# Patient Record
Sex: MALE | Race: WHITE | NOT HISPANIC OR LATINO | Employment: OTHER | URBAN - METROPOLITAN AREA
[De-identification: names, ages, dates, MRNs, and addresses within clinical notes are randomized per-mention and may not be internally consistent; named-entity substitution may affect disease eponyms.]

---

## 2020-05-07 ENCOUNTER — HOSPITAL ENCOUNTER (INPATIENT)
Facility: HOSPITAL | Age: 70
LOS: 8 days | Discharge: PSYCHIATRIC OR SUBSTANCE USE FACILITY | DRG: 871 | End: 2020-05-15
Attending: EMERGENCY MEDICINE | Admitting: INTERNAL MEDICINE
Payer: MEDICARE

## 2020-05-07 DIAGNOSIS — R53.1 GENERALIZED WEAKNESS: ICD-10-CM

## 2020-05-07 DIAGNOSIS — R65.20 SEPSIS WITH METABOLIC ENCEPHALOPATHY: Primary | ICD-10-CM

## 2020-05-07 DIAGNOSIS — N39.0 URINARY TRACT INFECTION WITHOUT HEMATURIA, SITE UNSPECIFIED: ICD-10-CM

## 2020-05-07 DIAGNOSIS — N30.00 ACUTE CYSTITIS: ICD-10-CM

## 2020-05-07 DIAGNOSIS — G93.41 SEPSIS WITH METABOLIC ENCEPHALOPATHY: Primary | ICD-10-CM

## 2020-05-07 DIAGNOSIS — I10 EPISODE OF HYPERTENSION: ICD-10-CM

## 2020-05-07 DIAGNOSIS — A41.9 SEPSIS WITH METABOLIC ENCEPHALOPATHY: Primary | ICD-10-CM
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
## 2020-05-07 PROBLEM — F10.10 ALCOHOL ABUSE: Status: ACTIVE | Noted: 2020-05-07

## 2020-05-07 LAB
ALBUMIN SERPL BCP-MCNC: 3.7 G/DL (ref 3.5–5.2)
ALP SERPL-CCNC: 80 U/L (ref 55–135)
ALT SERPL W/O P-5'-P-CCNC: 21 U/L (ref 10–44)
ANION GAP SERPL CALC-SCNC: 11 MMOL/L (ref 8–16)
AST SERPL-CCNC: 17 U/L (ref 10–40)
BACTERIA #/AREA URNS HPF: ABNORMAL /HPF
BASOPHILS # BLD AUTO: 0.05 K/UL (ref 0–0.2)
BASOPHILS NFR BLD: 0.3 % (ref 0–1.9)
BILIRUB SERPL-MCNC: 1.3 MG/DL (ref 0.1–1)
BILIRUB UR QL STRIP: NEGATIVE
BUN SERPL-MCNC: 15 MG/DL (ref 8–23)
CALCIUM SERPL-MCNC: 8.4 MG/DL (ref 8.7–10.5)
CHLORIDE SERPL-SCNC: 100 MMOL/L (ref 95–110)
CK SERPL-CCNC: 52 U/L (ref 20–200)
CLARITY UR: ABNORMAL
CO2 SERPL-SCNC: 24 MMOL/L (ref 23–29)
COLOR UR: YELLOW
CREAT SERPL-MCNC: 1 MG/DL (ref 0.5–1.4)
CRP SERPL-MCNC: 14.69 MG/DL (ref 0–0.75)
DIFFERENTIAL METHOD: ABNORMAL
EOSINOPHIL # BLD AUTO: 0.1 K/UL (ref 0–0.5)
EOSINOPHIL NFR BLD: 0.5 % (ref 0–8)
ERYTHROCYTE [DISTWIDTH] IN BLOOD BY AUTOMATED COUNT: 12.7 % (ref 11.5–14.5)
ERYTHROCYTE [SEDIMENTATION RATE] IN BLOOD BY WESTERGREN METHOD: >90 MM/HR (ref 0–10)
EST. GFR  (AFRICAN AMERICAN): >60 ML/MIN/1.73 M^2
EST. GFR  (NON AFRICAN AMERICAN): >60 ML/MIN/1.73 M^2
GLUCOSE SERPL-MCNC: 101 MG/DL (ref 70–110)
GLUCOSE UR QL STRIP: NEGATIVE
HCT VFR BLD AUTO: 41.9 % (ref 40–54)
HGB BLD-MCNC: 14.4 G/DL (ref 14–18)
HGB UR QL STRIP: ABNORMAL
HYALINE CASTS #/AREA URNS LPF: 0 /LPF
IMM GRANULOCYTES # BLD AUTO: 0.07 K/UL (ref 0–0.04)
IMM GRANULOCYTES NFR BLD AUTO: 0.5 % (ref 0–0.5)
KETONES UR QL STRIP: NEGATIVE
LACTATE SERPL-SCNC: 1 MMOL/L (ref 0.5–2.2)
LEUKOCYTE ESTERASE UR QL STRIP: ABNORMAL
LYMPHOCYTES # BLD AUTO: 1.4 K/UL (ref 1–4.8)
LYMPHOCYTES NFR BLD: 9.3 % (ref 18–48)
MCH RBC QN AUTO: 30.3 PG (ref 27–31)
MCHC RBC AUTO-ENTMCNC: 34.4 G/DL (ref 32–36)
MCV RBC AUTO: 88 FL (ref 82–98)
MICROSCOPIC COMMENT: ABNORMAL
MONOCYTES # BLD AUTO: 1.7 K/UL (ref 0.3–1)
MONOCYTES NFR BLD: 11.3 % (ref 4–15)
NEUTROPHILS # BLD AUTO: 11.8 K/UL (ref 1.8–7.7)
NEUTROPHILS NFR BLD: 78.1 % (ref 38–73)
NITRITE UR QL STRIP: POSITIVE
NRBC BLD-RTO: 0 /100 WBC
PH UR STRIP: 5 [PH] (ref 5–8)
PLATELET # BLD AUTO: 271 K/UL (ref 150–350)
PMV BLD AUTO: 10 FL (ref 9.2–12.9)
POTASSIUM SERPL-SCNC: 3.4 MMOL/L (ref 3.5–5.1)
PROT SERPL-MCNC: 8.4 G/DL (ref 6–8.4)
PROT UR QL STRIP: ABNORMAL
RBC # BLD AUTO: 4.75 M/UL (ref 4.6–6.2)
RBC #/AREA URNS HPF: 15 /HPF (ref 0–4)
SARS-COV-2 RDRP RESP QL NAA+PROBE: NEGATIVE
SODIUM SERPL-SCNC: 135 MMOL/L (ref 136–145)
SP GR UR STRIP: 1.01 (ref 1–1.03)
URN SPEC COLLECT METH UR: ABNORMAL
UROBILINOGEN UR STRIP-ACNC: NEGATIVE EU/DL
WBC # BLD AUTO: 15.09 K/UL (ref 3.9–12.7)
WBC #/AREA URNS HPF: >100 /HPF (ref 0–5)

## 2020-05-07 PROCEDURE — 11000001 HC ACUTE MED/SURG PRIVATE ROOM

## 2020-05-07 PROCEDURE — 71045 XR CHEST 1 VIEW: ICD-10-PCS | Mod: 26,,, | Performed by: RADIOLOGY

## 2020-05-07 PROCEDURE — 83605 ASSAY OF LACTIC ACID: CPT

## 2020-05-07 PROCEDURE — 70450 CT HEAD/BRAIN W/O DYE: CPT | Mod: 26,,, | Performed by: RADIOLOGY

## 2020-05-07 PROCEDURE — 71045 X-RAY EXAM CHEST 1 VIEW: CPT | Mod: TC,FY

## 2020-05-07 PROCEDURE — 51702 INSERT TEMP BLADDER CATH: CPT

## 2020-05-07 PROCEDURE — 81000 URINALYSIS NONAUTO W/SCOPE: CPT

## 2020-05-07 PROCEDURE — 87077 CULTURE AEROBIC IDENTIFY: CPT

## 2020-05-07 PROCEDURE — 85025 COMPLETE CBC W/AUTO DIFF WBC: CPT

## 2020-05-07 PROCEDURE — 80053 COMPREHEN METABOLIC PANEL: CPT

## 2020-05-07 PROCEDURE — 63600175 PHARM REV CODE 636 W HCPCS: Performed by: EMERGENCY MEDICINE

## 2020-05-07 PROCEDURE — 85651 RBC SED RATE NONAUTOMATED: CPT

## 2020-05-07 PROCEDURE — 96365 THER/PROPH/DIAG IV INF INIT: CPT

## 2020-05-07 PROCEDURE — 82550 ASSAY OF CK (CPK): CPT

## 2020-05-07 PROCEDURE — 70450 CT HEAD/BRAIN W/O DYE: CPT | Mod: TC

## 2020-05-07 PROCEDURE — 87088 URINE BACTERIA CULTURE: CPT

## 2020-05-07 PROCEDURE — 86140 C-REACTIVE PROTEIN: CPT

## 2020-05-07 PROCEDURE — 63600175 PHARM REV CODE 636 W HCPCS

## 2020-05-07 PROCEDURE — 87186 SC STD MICRODIL/AGAR DIL: CPT

## 2020-05-07 PROCEDURE — 87040 BLOOD CULTURE FOR BACTERIA: CPT

## 2020-05-07 PROCEDURE — 12000002 HC ACUTE/MED SURGE SEMI-PRIVATE ROOM

## 2020-05-07 PROCEDURE — U0002 COVID-19 LAB TEST NON-CDC: HCPCS

## 2020-05-07 PROCEDURE — 70450 CT HEAD WITHOUT CONTRAST: ICD-10-PCS | Mod: 26,,, | Performed by: RADIOLOGY

## 2020-05-07 PROCEDURE — 87086 URINE CULTURE/COLONY COUNT: CPT

## 2020-05-07 PROCEDURE — 99285 EMERGENCY DEPT VISIT HI MDM: CPT | Mod: 25

## 2020-05-07 PROCEDURE — 71045 X-RAY EXAM CHEST 1 VIEW: CPT | Mod: 26,,, | Performed by: RADIOLOGY

## 2020-05-07 RX ORDER — IPRATROPIUM BROMIDE AND ALBUTEROL SULFATE 2.5; .5 MG/3ML; MG/3ML
3 SOLUTION RESPIRATORY (INHALATION) EVERY 6 HOURS PRN
Status: DISCONTINUED | OUTPATIENT
Start: 2020-05-07 | End: 2020-05-10

## 2020-05-07 RX ORDER — IBUPROFEN 400 MG/1
400 TABLET ORAL EVERY 6 HOURS PRN
Status: DISCONTINUED | OUTPATIENT
Start: 2020-05-07 | End: 2020-05-15 | Stop reason: HOSPADM

## 2020-05-07 RX ORDER — KETOROLAC TROMETHAMINE 30 MG/ML
15 INJECTION, SOLUTION INTRAMUSCULAR; INTRAVENOUS EVERY 6 HOURS PRN
Status: DISPENSED | OUTPATIENT
Start: 2020-05-07 | End: 2020-05-10

## 2020-05-07 RX ORDER — ACETAMINOPHEN 325 MG/1
650 TABLET ORAL EVERY 4 HOURS PRN
Status: DISCONTINUED | OUTPATIENT
Start: 2020-05-07 | End: 2020-05-15 | Stop reason: HOSPADM

## 2020-05-07 RX ORDER — ONDANSETRON 2 MG/ML
4 INJECTION INTRAMUSCULAR; INTRAVENOUS EVERY 8 HOURS PRN
Status: DISCONTINUED | OUTPATIENT
Start: 2020-05-07 | End: 2020-05-15 | Stop reason: HOSPADM

## 2020-05-07 RX ORDER — AMOXICILLIN 250 MG
1 CAPSULE ORAL 2 TIMES DAILY
Status: DISCONTINUED | OUTPATIENT
Start: 2020-05-07 | End: 2020-05-15 | Stop reason: HOSPADM

## 2020-05-07 RX ORDER — DIAZEPAM 5 MG/1
5 TABLET ORAL EVERY 8 HOURS
Status: DISCONTINUED | OUTPATIENT
Start: 2020-05-08 | End: 2020-05-13

## 2020-05-07 RX ORDER — ZIPRASIDONE MESYLATE 20 MG/ML
INJECTION, POWDER, LYOPHILIZED, FOR SOLUTION INTRAMUSCULAR
Status: DISPENSED
Start: 2020-05-07 | End: 2020-05-08

## 2020-05-07 RX ORDER — LORAZEPAM 2 MG/ML
1 INJECTION INTRAMUSCULAR EVERY 4 HOURS PRN
Status: DISCONTINUED | OUTPATIENT
Start: 2020-05-07 | End: 2020-05-15 | Stop reason: HOSPADM

## 2020-05-07 RX ORDER — SODIUM CHLORIDE 0.9 % (FLUSH) 0.9 %
10 SYRINGE (ML) INJECTION
Status: DISCONTINUED | OUTPATIENT
Start: 2020-05-07 | End: 2020-05-15 | Stop reason: HOSPADM

## 2020-05-07 RX ORDER — ENOXAPARIN SODIUM 100 MG/ML
40 INJECTION SUBCUTANEOUS EVERY 24 HOURS
Status: DISCONTINUED | OUTPATIENT
Start: 2020-05-07 | End: 2020-05-15 | Stop reason: HOSPADM

## 2020-05-07 RX ORDER — BISACODYL 10 MG
10 SUPPOSITORY, RECTAL RECTAL DAILY PRN
Status: DISCONTINUED | OUTPATIENT
Start: 2020-05-07 | End: 2020-05-15 | Stop reason: HOSPADM

## 2020-05-07 RX ADMIN — CEFTRIAXONE 1 G: 1 INJECTION, SOLUTION INTRAVENOUS at 08:05

## 2020-05-07 NOTE — ED PROVIDER NOTES
Encounter Date: 5/7/2020       History     Chief Complaint   Patient presents with    Fall    Fatigue     70-year-old male arriving per EMS status post reported fall.  Patient unable to ambulate after incident due to right lower extremity giving out.  Patient denies headache, chest pain or shortness of breath prior to after incident.  Denies striking his head.  Denies anticoagulant use.  Denies neck or back pain.  EMS reports patient was sitting upright with a neighbor upon their arrival.  EMS reports deplorable housing conditions.  Denies numbness, tingling or weakness.  Denies headache or vision changes.  Denies chest pain, shortness of breath or abdominal pain.  Denies dysuria frequency.  Patient febrile all at home and upon arrival.  Denies history of such.  Denies sore throat or cough.  Denies specific exacerbating or alleviating factors.  Denies injury to his back or lower extremity.  Patient reports he feels unstable when standing on the right lower extremity and this has been going on for quite some time.        Review of patient's allergies indicates:  No Known Allergies  History reviewed. No pertinent past medical history.  History reviewed. No pertinent surgical history.  No family history on file.  Social History     Tobacco Use    Smoking status: Never Smoker    Smokeless tobacco: Never Used   Substance Use Topics    Alcohol use: Yes    Drug use: Never     Review of Systems   Constitutional: Negative for appetite change and fever.   HENT: Negative for congestion, facial swelling, sinus pressure and sore throat.    Respiratory: Negative for chest tightness and shortness of breath.    Cardiovascular: Negative for chest pain and palpitations.   Gastrointestinal: Negative for abdominal pain, nausea and vomiting.   Genitourinary: Negative for dysuria, frequency and urgency.   Musculoskeletal: Negative for back pain.   Skin: Negative for rash and wound.   Neurological: Positive for weakness. Negative  for dizziness, seizures, facial asymmetry, speech difficulty, light-headedness and headaches.   Hematological: Does not bruise/bleed easily.       Physical Exam     Initial Vitals [05/07/20 1834]   BP Pulse Resp Temp SpO2   (!) 186/98 88 18 (!) 101.4 °F (38.6 °C) 96 %      MAP       --         Physical Exam    Nursing note and vitals reviewed.  Constitutional: He appears well-developed and well-nourished.   HENT:   Head: Normocephalic and atraumatic.   Mouth/Throat: Uvula is midline, oropharynx is clear and moist and mucous membranes are normal. No trismus in the jaw.   Eyes: Conjunctivae and EOM are normal. Pupils are equal, round, and reactive to light.   Neck: Normal range of motion. Neck supple.   Cardiovascular: Normal rate, regular rhythm, normal heart sounds and intact distal pulses.   Pulmonary/Chest: Breath sounds normal. No respiratory distress. He has no wheezes.   Abdominal: Soft. Bowel sounds are normal. He exhibits no distension. There is no tenderness.   Musculoskeletal: Normal range of motion. He exhibits no edema or tenderness.   No midline cervical, thoracic or lumbosacral spinal tenderness, step-off or crepitus.  No outward signs of trauma.  Pelvis stable to compression and reported nontender.  Full active/passive range of motion intact all 4 extremities without reported tenderness.   Neurological: He is alert and oriented to person, place, and time. GCS eye subscore is 4. GCS verbal subscore is 4. GCS motor subscore is 6.   No focal/lateralizing neuro deficit   Skin: Skin is warm. Capillary refill takes less than 2 seconds.         ED Course   Procedures  Labs Reviewed   CBC W/ AUTO DIFFERENTIAL - Abnormal; Notable for the following components:       Result Value    WBC 15.09 (*)     Gran # (ANC) 11.8 (*)     Immature Grans (Abs) 0.07 (*)     Mono # 1.7 (*)     Gran% 78.1 (*)     Lymph% 9.3 (*)     All other components within normal limits   COMPREHENSIVE METABOLIC PANEL - Abnormal; Notable for  the following components:    Sodium 135 (*)     Potassium 3.4 (*)     Calcium 8.4 (*)     Total Bilirubin 1.3 (*)     All other components within normal limits   C-REACTIVE PROTEIN - Abnormal; Notable for the following components:    CRP 14.69 (*)     All other components within normal limits   SEDIMENTATION RATE - Abnormal; Notable for the following components:    Sed Rate >90 (*)     All other components within normal limits   URINALYSIS, REFLEX TO URINE CULTURE - Abnormal; Notable for the following components:    Appearance, UA Cloudy (*)     Protein, UA 2+ (*)     Occult Blood UA 2+ (*)     Nitrite, UA Positive (*)     Leukocytes, UA 2+ (*)     All other components within normal limits    Narrative:     Preferred Collection Type->Urine, Catheterized   URINALYSIS MICROSCOPIC - Abnormal; Notable for the following components:    RBC, UA 15 (*)     WBC, UA >100 (*)     Bacteria Moderate (*)     All other components within normal limits    Narrative:     Preferred Collection Type->Urine, Catheterized   CULTURE, URINE   CULTURE, BLOOD   CULTURE, BLOOD   CK   LACTIC ACID, PLASMA   SARS-COV-2 RNA AMPLIFICATION, QUAL          Imaging Results          X-Ray Chest 1 View (In process)                CT Head Without Contrast (In process)               X-Rays:   Independently Interpreted Readings:   Other Readings:  Preliminary chest x-ray without acute cardiopulmonary processes.  No consolidation, pneumothorax or acute osseous processes.    Medical Decision Making:   Initial Assessment:   70-year-old male nontoxic-appearing, afebrile, hypertensive with history of fall at home and inability to ambulate.  Patient denies pain or direct trauma.  Denies loss of consciousness.  Denies numbness or tingling.  Patient does report weakness to the right knee with attempts at ambulation.  Denies rash or lesions.  Patient febrile with unknown source.  Will send screening labs.  X-ray imaging rule out occult osseous process;  reassessment  Clinical Tests:   Lab Tests: Ordered and Reviewed  Radiological Study: Ordered and Reviewed  ED Management:  Preliminary laboratory data with leukocytosis, no anemia, metabolic panel without acidemia.  BUN/creatinine within normal limits.  Blood cultures x2 pending.  Urinalysis with nitrite positive urinary tract infection.    Patient GCS 14 with intermittent outbursts of anger.  Patient for feels criteria for sepsis with likely source of urinary tract infection.    COVID-19 negative.    Based on HPI, physical exam with metabolic encephalopathy secondary to sepsis likely source is UTI patient be admitted for IV antibiotics with possible discharge to skilled nursing facility under hospitalist Medicine Dr. hill                                 Clinical Impression:       ICD-10-CM ICD-9-CM   1. Sepsis with metabolic encephalopathy A41.9 038.9    R65.20 995.92    G93.41 348.31   2. Fall W19.XXXA E888.9   3. Urinary tract infection without hematuria, site unspecified N39.0 599.0   4. Generalized weakness R53.1 780.79   5. Episode of hypertension I10 401.9             ED Disposition Condition    Admit                           John Schreiber,   05/07/20 9885

## 2020-05-08 LAB
ALBUMIN SERPL BCP-MCNC: 3.7 G/DL (ref 3.5–5.2)
ALP SERPL-CCNC: 77 U/L (ref 55–135)
ALT SERPL W/O P-5'-P-CCNC: 21 U/L (ref 10–44)
ANION GAP SERPL CALC-SCNC: 10 MMOL/L (ref 8–16)
AST SERPL-CCNC: 17 U/L (ref 10–40)
BASOPHILS # BLD AUTO: 0.04 K/UL (ref 0–0.2)
BASOPHILS NFR BLD: 0.3 % (ref 0–1.9)
BILIRUB SERPL-MCNC: 1.4 MG/DL (ref 0.1–1)
BUN SERPL-MCNC: 16 MG/DL (ref 8–23)
CALCIUM SERPL-MCNC: 8.3 MG/DL (ref 8.7–10.5)
CHLORIDE SERPL-SCNC: 101 MMOL/L (ref 95–110)
CO2 SERPL-SCNC: 24 MMOL/L (ref 23–29)
CREAT SERPL-MCNC: 1 MG/DL (ref 0.5–1.4)
DIFFERENTIAL METHOD: ABNORMAL
EOSINOPHIL # BLD AUTO: 0 K/UL (ref 0–0.5)
EOSINOPHIL NFR BLD: 0.2 % (ref 0–8)
ERYTHROCYTE [DISTWIDTH] IN BLOOD BY AUTOMATED COUNT: 13.1 % (ref 11.5–14.5)
EST. GFR  (AFRICAN AMERICAN): >60 ML/MIN/1.73 M^2
EST. GFR  (NON AFRICAN AMERICAN): >60 ML/MIN/1.73 M^2
GLUCOSE SERPL-MCNC: 114 MG/DL (ref 70–110)
HCT VFR BLD AUTO: 40.2 % (ref 40–54)
HGB BLD-MCNC: 13.6 G/DL (ref 14–18)
IMM GRANULOCYTES # BLD AUTO: 0.08 K/UL (ref 0–0.04)
IMM GRANULOCYTES NFR BLD AUTO: 0.6 % (ref 0–0.5)
LYMPHOCYTES # BLD AUTO: 1.6 K/UL (ref 1–4.8)
LYMPHOCYTES NFR BLD: 11 % (ref 18–48)
MAGNESIUM SERPL-MCNC: 2 MG/DL (ref 1.6–2.6)
MCH RBC QN AUTO: 29.8 PG (ref 27–31)
MCHC RBC AUTO-ENTMCNC: 33.8 G/DL (ref 32–36)
MCV RBC AUTO: 88 FL (ref 82–98)
MONOCYTES # BLD AUTO: 1.5 K/UL (ref 0.3–1)
MONOCYTES NFR BLD: 10.8 % (ref 4–15)
NEUTROPHILS # BLD AUTO: 11.1 K/UL (ref 1.8–7.7)
NEUTROPHILS NFR BLD: 77.1 % (ref 38–73)
NRBC BLD-RTO: 0 /100 WBC
PHOSPHATE SERPL-MCNC: 2.7 MG/DL (ref 2.7–4.5)
PLATELET # BLD AUTO: 283 K/UL (ref 150–350)
PMV BLD AUTO: 10.8 FL (ref 9.2–12.9)
POTASSIUM SERPL-SCNC: 3.2 MMOL/L (ref 3.5–5.1)
PROT SERPL-MCNC: 7.8 G/DL (ref 6–8.4)
RBC # BLD AUTO: 4.57 M/UL (ref 4.6–6.2)
SODIUM SERPL-SCNC: 135 MMOL/L (ref 136–145)
WBC # BLD AUTO: 14.32 K/UL (ref 3.9–12.7)

## 2020-05-08 PROCEDURE — 94761 N-INVAS EAR/PLS OXIMETRY MLT: CPT

## 2020-05-08 PROCEDURE — 86580 TB INTRADERMAL TEST: CPT | Performed by: INTERNAL MEDICINE

## 2020-05-08 PROCEDURE — 63600175 PHARM REV CODE 636 W HCPCS: Performed by: HOSPITALIST

## 2020-05-08 PROCEDURE — 97161 PT EVAL LOW COMPLEX 20 MIN: CPT

## 2020-05-08 PROCEDURE — 99233 PR SUBSEQUENT HOSPITAL CARE,LEVL III: ICD-10-PCS | Mod: ,,, | Performed by: INTERNAL MEDICINE

## 2020-05-08 PROCEDURE — 25000003 PHARM REV CODE 250: Performed by: INTERNAL MEDICINE

## 2020-05-08 PROCEDURE — 84100 ASSAY OF PHOSPHORUS: CPT

## 2020-05-08 PROCEDURE — 97530 THERAPEUTIC ACTIVITIES: CPT

## 2020-05-08 PROCEDURE — 11000001 HC ACUTE MED/SURG PRIVATE ROOM

## 2020-05-08 PROCEDURE — 80053 COMPREHEN METABOLIC PANEL: CPT

## 2020-05-08 PROCEDURE — 99233 SBSQ HOSP IP/OBS HIGH 50: CPT | Mod: ,,, | Performed by: INTERNAL MEDICINE

## 2020-05-08 PROCEDURE — 85025 COMPLETE CBC W/AUTO DIFF WBC: CPT

## 2020-05-08 PROCEDURE — 25000003 PHARM REV CODE 250: Performed by: HOSPITALIST

## 2020-05-08 PROCEDURE — 25000242 PHARM REV CODE 250 ALT 637 W/ HCPCS: Performed by: HOSPITALIST

## 2020-05-08 PROCEDURE — 30200315 PPD INTRADERMAL TEST REV CODE 302: Performed by: INTERNAL MEDICINE

## 2020-05-08 PROCEDURE — 83735 ASSAY OF MAGNESIUM: CPT

## 2020-05-08 PROCEDURE — 63600175 PHARM REV CODE 636 W HCPCS: Performed by: INTERNAL MEDICINE

## 2020-05-08 PROCEDURE — 36415 COLL VENOUS BLD VENIPUNCTURE: CPT

## 2020-05-08 PROCEDURE — 94640 AIRWAY INHALATION TREATMENT: CPT

## 2020-05-08 RX ORDER — POTASSIUM CHLORIDE 20 MEQ/1
20 TABLET, EXTENDED RELEASE ORAL 2 TIMES DAILY
Status: DISCONTINUED | OUTPATIENT
Start: 2020-05-08 | End: 2020-05-15 | Stop reason: HOSPADM

## 2020-05-08 RX ORDER — POTASSIUM CHLORIDE 7.45 MG/ML
10 INJECTION INTRAVENOUS ONCE
Status: COMPLETED | OUTPATIENT
Start: 2020-05-08 | End: 2020-05-08

## 2020-05-08 RX ORDER — CLORAZEPATE DIPOTASSIUM 7.5 MG/1
7.5 TABLET ORAL 3 TIMES DAILY
Status: DISCONTINUED | OUTPATIENT
Start: 2020-05-08 | End: 2020-05-14

## 2020-05-08 RX ORDER — ZIPRASIDONE MESYLATE 20 MG/ML
10 INJECTION, POWDER, LYOPHILIZED, FOR SOLUTION INTRAMUSCULAR
Status: ACTIVE | OUTPATIENT
Start: 2020-05-08 | End: 2020-05-08

## 2020-05-08 RX ADMIN — POTASSIUM CHLORIDE 20 MEQ: 1500 TABLET, EXTENDED RELEASE ORAL at 11:05

## 2020-05-08 RX ADMIN — CLORAZEPATE DIPOTASSIUM 7.5 MG: 7.5 TABLET ORAL at 11:05

## 2020-05-08 RX ADMIN — DIAZEPAM 5 MG: 5 TABLET ORAL at 02:05

## 2020-05-08 RX ADMIN — SENNOSIDES AND DOCUSATE SODIUM 1 TABLET: 8.6; 5 TABLET ORAL at 08:05

## 2020-05-08 RX ADMIN — CEFTRIAXONE SODIUM 1 G: 1 INJECTION, POWDER, FOR SOLUTION INTRAMUSCULAR; INTRAVENOUS at 11:05

## 2020-05-08 RX ADMIN — LORAZEPAM 1 MG: 2 INJECTION INTRAMUSCULAR; INTRAVENOUS at 06:05

## 2020-05-08 RX ADMIN — ACETAMINOPHEN 650 MG: 325 TABLET ORAL at 03:05

## 2020-05-08 RX ADMIN — CLORAZEPATE DIPOTASSIUM 7.5 MG: 7.5 TABLET ORAL at 12:05

## 2020-05-08 RX ADMIN — TUBERCULIN PURIFIED PROTEIN DERIVATIVE 5 UNITS: 5 INJECTION INTRADERMAL at 12:05

## 2020-05-08 RX ADMIN — SENNOSIDES AND DOCUSATE SODIUM 1 TABLET: 8.6; 5 TABLET ORAL at 11:05

## 2020-05-08 RX ADMIN — IBUPROFEN 400 MG: 400 TABLET, FILM COATED ORAL at 02:05

## 2020-05-08 RX ADMIN — ACETAMINOPHEN 650 MG: 325 TABLET ORAL at 05:05

## 2020-05-08 RX ADMIN — DIAZEPAM 5 MG: 5 TABLET ORAL at 11:05

## 2020-05-08 RX ADMIN — IPRATROPIUM BROMIDE AND ALBUTEROL SULFATE 3 ML: .5; 3 SOLUTION RESPIRATORY (INHALATION) at 05:05

## 2020-05-08 RX ADMIN — CEFTRIAXONE SODIUM 1 G: 1 INJECTION, POWDER, FOR SOLUTION INTRAMUSCULAR; INTRAVENOUS at 01:05

## 2020-05-08 RX ADMIN — DIAZEPAM 5 MG: 5 TABLET ORAL at 05:05

## 2020-05-08 RX ADMIN — ENOXAPARIN SODIUM 40 MG: 100 INJECTION SUBCUTANEOUS at 04:05

## 2020-05-08 RX ADMIN — LORAZEPAM 1 MG: 2 INJECTION INTRAMUSCULAR; INTRAVENOUS at 12:05

## 2020-05-08 RX ADMIN — POTASSIUM CHLORIDE 10 MEQ: 7.46 INJECTION, SOLUTION INTRAVENOUS at 11:05

## 2020-05-08 NOTE — PLAN OF CARE
05/08/20 1227   Discharge Assessment   Assessment Type Discharge Planning Assessment   Confirmed/corrected address and phone number on facesheet? Yes   Assessment information obtained from? Other;Caregiver  (Information obtained from Dmitri Mariscal, Friend and Murphy Thomas, brother. )   Prior to hospitilization cognitive status: Not Oriented to Place;Not Oriented to Time   Prior to hospitalization functional status: Needs Assistance   Current cognitive status: Not Oriented to Place;Not Oriented to Time   Current Functional Status: Needs Assistance   Facility Arrived From: Home via EMS   Lives With alone   Able to Return to Prior Arrangements no   Is patient able to care for self after discharge? No   Who are your caregiver(s) and their phone number(s)? Murphy Thomas - brother [phone]   Patient's perception of discharge disposition other (comments)  (Pt disoriented and unable to understand discharge disposition or concerns to return home alone. )   Readmission Within the Last 30 Days no previous admission in last 30 days   Patient currently being followed by outpatient case management? No   Patient currently receives any other outside agency services? No   Equipment Currently Used at Home walker, standard   Do you have any problems affording any of your prescribed medications? No  (Pt does not take medications at home. )   Is the patient taking medications as prescribed?   (Pt does not take medications at home. )   Does the patient have transportation home? Yes   Transportation Anticipated family or friend will provide   Dialysis Name and Scheduled days n/a   Does the patient receive services at [facility]? No   Discharge Plan A Skilled Nursing Facility   Discharge Plan B New Nursing Home placement - nursing home care facility   DME Needed Upon Discharge  none   Patient/Family in Agreement with Plan yes     Pt is a 70 year old male admitted after a fall. He was brought into the ER via EMS. Pt was unable to  provide accurate information for assessment. Pt did not know where he was located, did not know date, day or season. He does not know the year or details of his current living arrangement.     RAMIRO contacted and spoke to Dmitri Mariscal ([phone]) who is listed in the demographics as an emergency contact. Ms Mariscal informs she is a friend that lives in the [facility] where the pt resides and she has helped care for him for the past 1.5 years. She reports confusion of the pt and states it has gotten worse in the recent past. She also informs the pt typically drank 8-12 beers per day, but has decreased to about 6 per day since the COVID isolation and her inability to get his usual supply. Pt has great difficulty ambulating in his motor home and refuses to use a walker for assistance per this friend. RAMIRO was provided name and contact information of the pts brother that lives locally.     RAMIRO contacted the pts brother (Murphy Thomas [phone]) who states the pt has been very difficult to reason with and has not been willing to make choice of placement for care. Brother is in agreement the pt is requiring nursing home care at this time, but the pt has never been in agreement to make safe choices.     Brother is willing to assist with admitting pt into a nursing home for skilled vs long term care, as the pt is unable to make decisions for self at this time. Discussed pts living environment and plan for placement with the physician. RAMIRO has initiated referral to [facility] for placement. Will follow and assist as needed.

## 2020-05-08 NOTE — PT/OT/SLP EVAL
Physical Therapy Evaluation    Patient Name:  Waldo Cedeno   MRN:  [id number]    Recommendations:     Discharge Recommendations:  (skilled vs LTC pending clinical course)   Discharge Equipment Recommendations: (to be determined)   Barriers to discharge: no available caregiver, patient is unable to live alone or care for himself     Assessment:     Waldo Cedeno is a 70 y.o. male admitted with a medical diagnosis of Acute cystitis.  He presents with the following impairments/functional limitations:  weakness, impaired endurance, impaired self care skills, gait instability, impaired functional mobilty, impaired balance, impaired cognition, impaired muscle length, impaired joint extensibility, decreased safety awareness. Patient presents with progressive decline in cognition, functional mobility and self care with significant deconditioning and cognitive decline. He is unable to live alone and care for himself and will require placement following discharge from hospital.    Rehab Prognosis: Fair; patient would benefit from acute skilled PT services to address these deficits and reach maximum level of function.    Recent Surgery: * No surgery found *      Plan:     During this hospitalization, patient to be seen (QD M-F) to address the identified rehab impairments via therapeutic activities, therapeutic exercises, gait training and progress toward the following goals:    · Plan of Care Expires:  05/15/20    Subjective     Chief Complaint: he is being held here against his will  Patient/Family Comments/goals: patient stating he does not have a home but his dog is living in [facility], he does not know how he got here, patient agreeable to PT eval  Pain/Comfort:  · Pain Rating 1: (patient complaining of pain in his thighs but was unable to specify or provide numerical pain rating )  · Pain Addressed 1: Distraction, Reposition    Patients cultural, spiritual, Zoroastrian conflicts given the current situation:  no    Living Environment:  No family/visitors at bedside at time of PT evaluation. Per chart review patient [place] and has a brother living in [place]. He has a friend living in  park as well who has been assisting in his care and reports daily ETOH use, recent worsening confusion and decline in functional status with fall.    Prior to admission, patients level of function was declining.  Equipment used at home: (unable to verifiy ).  DME owned (not currently used): std walker.  Upon discharge, patient will have assistance from friend/family.    Objective:     Communicated with RNKaila prior to session. RN stating she just left patients room after having cleaned him up following BM. She reports patient is confused and at times inappropriate however cooperative. Patient found HOB elevated with bed alarm, peripheral IV([employer] telesitter)  upon PT entry to room.    General Precautions: Standard, fall(impaired cognition)   Orthopedic Precautions:N/A   Braces: N/A     Exams:  · Cognitive Exam:  Patient is oriented to Person only, does not know where he lives or where/why his is here  · Gross Motor Coordination:  impaired  · RUE ROM: WFL  · RUE Strength: WFL  · LUE ROM: WFL  · LUE Strength: WFL  · RLE ROM: resting in frog leg position, limitations noted in hip IR/ADD/Ext, knee extension deficit, and ankle DF to neutral  · RLE Strength: proximal hip muscles 3/5, distal 4-/5  · LLE ROM:  Resting in frog leg position, limitations noted in hip IR/ADD/Ext, knee extension deficit, and ankle DF to neutral  · LLE Strength: proximal hip muscles 3/5 to 3+/5, distal 4/5    Functional Mobility:  · Bed Mobility:  Rolling Left:  maximal assistance  · Scooting: moderate to maximal assistance, verbal cueing for technique  · Bridging: unable to perform  · Supine to Sit: maximal assistance, verbal cueing for techique  · Sit to Supine: maximal assistance of 2  · Transfers: total assist  · Gait: unable to  perform  · Balance: sitting balance poor+ with trunk lean to the right, standing poor      Therapeutic Activities and Exercises:  Patient participated in transfer to sitting on EOB with max assist requiring CTG to moderate assist to maintain sitting balance with trunk lean to the right. Scooting activities up to head of bed with mod/max assist and verbal cueing for technique. Patient performed AROM to BLE's, trunk extension and reaching activities across midline. Attempted sit to stand using RW and max assist x 3 however patient unable to stand erect and clear buttocks from bed to achieve. Transfer back supine with max assist of 2., total assist using draw sheet for upward mobility and repositioning in bed.       - Pt educated on:              -PT roles, expectations, and POC               -Safety with mobility              -Benefits of OOB activities to increase strength and functional mobility               -Performing ther ex for increasing LE ROM and strength              -Discharge recommendations     AM-PAC 6 CLICK MOBILITY  Total Score:      Patient left HOB elevated with all lines intact, call button in reach, bed alarm on and RN notified.    GOALS:   Multidisciplinary Problems     Physical Therapy Goals        Problem: Physical Therapy Goal    Goal Priority Disciplines Outcome Goal Variances Interventions   Physical Therapy Goal     PT, PT/OT      Description:  Goals to be met by: discharge     Patient will increase functional independence with mobility by performin. Supine to sit with Contact Guard to Minimal Assistance  2. Sit to supine with Contact Guard to Minimal Assistance  3. Sit to stand transfer with Moderate Assistance  4. Bed to chair transfer with Moderate Assistance using Rolling Walker                      History:     History reviewed. No pertinent past medical history.    History reviewed. No pertinent surgical history.    Time Tracking:     PT Received On: 20  PT Start Time:  1340     PT Stop Time: 1415  PT Total Time (min): 35 min     Billable Minutes: Evaluation 15 min and Therapeutic Activity 12 min      Hamilton Escobedo, PT  05/08/2020

## 2020-05-08 NOTE — PROGRESS NOTES
[facility] Medicine  Progress Note    Patient Name: Waldo Cedeno  MRN: [id number]  Patient Class: IP- Inpatient   Admission Date: 5/7/2020  Length of Stay: 1 days  Attending Physician: Mohamud Fregoso MD  Primary Care Provider: Primary Doctor No        Subjective:     Principal Problem:Acute cystitis        HPI:  History of Present Illness:  Patient is a 70 y.o. male who has no past medical history on file presented to ED after EMS was called to patients house for reported fall. Patient is currently confused and agitated. He is oriented to self only and angry about being in an unfamiliar environment. He is poor historian and unable to provide history. No family at bedside. Patient states he feels fine. Denies any pain. No evidence of trauma on patient. He lives alone and per reports he is not able to care for himself. Family unable to provide assistance. He was found to have UTI and will be admitted for IV abx with possible SNF vs NH placement after discharge.    Overview/Hospital Course:  05/08/2020:  Patient is a 70-year-old male that was admitted last evening with reportedly having fallen at home and is unable to care for himself.  Patient states he does not know why he is here he feels fine.  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  Vital signs showed blood pressure mildly elevated 163/67 pulse 73 respirations 18 temperature 99.5° the and O2 sat 93%.  Patient showed T-max of 101.1° overnight.  Labs show a white blood cell count of 14.3 hemoglobin 13.6 hematocrit 40 differential shows 77 granulocytes 11 lymphocytes sed rate greater than 90.  Chemistry shows sodium 135 potassium 3.2 chloride 101 bicarb 24 anion gap 10 BUN 16 creatinine 1.0 and GFR greater than 60  CT of the brain revealed cortical atrophy deep white matter changes mild-to-moderate hydrocephalus.  Chest x-ray revealed no acute chest  disease.    Interval History:     Review of Systems   Constitutional: Positive for fatigue and fever. Negative for activity change and appetite change.   HENT: Negative for congestion, ear discharge, mouth sores, nosebleeds, rhinorrhea, sinus pressure, sinus pain and tinnitus.    Eyes: Negative.  Negative for pain, redness and itching.   Respiratory: Positive for cough. Negative for apnea, choking, chest tightness, shortness of breath, wheezing and stridor.    Cardiovascular: Negative for chest pain, palpitations and leg swelling.   Gastrointestinal: Negative for abdominal distention, abdominal pain, anal bleeding, blood in stool, constipation and diarrhea.   Endocrine: Negative.    Genitourinary: Positive for difficulty urinating and dysuria. Negative for flank pain, frequency and urgency.   Musculoskeletal: Negative for arthralgias, back pain, gait problem and myalgias.   Skin: Negative for color change and pallor.   Allergic/Immunologic: Negative.    Neurological: Positive for dizziness and weakness. Negative for facial asymmetry, light-headedness and headaches.   Hematological: Negative for adenopathy. Does not bruise/bleed easily.   Psychiatric/Behavioral: Positive for confusion. The patient is nervous/anxious.      Objective:     Vital Signs (Most Recent):  Temp: 99.5 °F (37.5 °C) (05/08/20 1052)  Pulse: 73 (05/08/20 1052)  Resp: 18 (05/08/20 1052)  BP: (!) 163/67 (05/08/20 1052)  SpO2: (!) 93 % (05/08/20 1052) Vital Signs (24h Range):  Temp:  [97.8 °F (36.6 °C)-101.4 °F (38.6 °C)] 99.5 °F (37.5 °C)  Pulse:  [73-91] 73  Resp:  [17-21] 18  SpO2:  [93 %-97 %] 93 %  BP: (125-199)/(67-98) 163/67     Weight: 114.6 kg (252 lb 10.4 oz)  Body mass index is 36.25 kg/m².    Intake/Output Summary (Last 24 hours) at 5/8/2020 1137  Last data filed at 5/8/2020 1000  Gross per 24 hour   Intake 230 ml   Output 1 ml   Net 229 ml      Physical Exam   Constitutional: He is oriented to person, place, and time. He appears  well-developed and well-nourished.   HENT:   Head: Normocephalic and atraumatic.   Eyes: Pupils are equal, round, and reactive to light. EOM are normal.   Neck: Normal range of motion. Neck supple. No tracheal deviation present.   Cardiovascular: Normal rate, regular rhythm and normal heart sounds.   Pulmonary/Chest: Effort normal and breath sounds normal.   Abdominal: Soft. Bowel sounds are normal. He exhibits no distension. There is tenderness. There is guarding. There is no rebound.   Musculoskeletal: Normal range of motion.   Lymphadenopathy:     He has no cervical adenopathy.   Neurological: He is alert and oriented to person, place, and time.   Skin: Skin is warm and dry. Capillary refill takes less than 2 seconds.   Psychiatric: He has a normal mood and affect. His behavior is normal. Judgment and thought content normal.   Nursing note and vitals reviewed.      Significant Labs:   Recent Lab Results       05/08/20  0537   05/07/20  2117   05/07/20  1931   05/07/20  1909        Albumin 3.7     3.7     Alkaline Phosphatase 77     80     ALT 21     21     Anion Gap 10     11     Appearance, UA     Cloudy       AST 17     17     Bacteria, UA     Moderate       Baso # 0.04     0.05     Basophil% 0.3     0.3     Bilirubin (UA)     Negative       BILIRUBIN TOTAL 1.4  Comment:  For infants and newborns, interpretation of results should be based  on gestational age, weight and in agreement with clinical  observations.  Premature Infant recommended reference ranges:  Up to 24 hours.............<8.0 mg/dL  Up to 48 hours............<12.0 mg/dL  3-5 days..................<15.0 mg/dL  6-29 days.................<15.0 mg/dL       1.3  Comment:  For infants and newborns, interpretation of results should be based  on gestational age, weight and in agreement with clinical  observations.  Premature Infant recommended reference ranges:  Up to 24 hours.............<8.0 mg/dL  Up to 48 hours............<12.0 mg/dL  3-5  days..................<15.0 mg/dL  6-29 days.................<15.0 mg/dL       BUN, Bld 16     15     Calcium 8.3     8.4     Chloride 101     100     CO2 24     24     Color, UA     Yellow       CPK       52     Creatinine 1.0     1.0     CRP       14.69     Differential Method Automated     Automated     eGFR if  >60.0     >60.0     eGFR if non  >60.0  Comment:  Calculation used to obtain the estimated glomerular filtration  rate (eGFR) is the CKD-EPI equation.        >60.0  Comment:  Calculation used to obtain the estimated glomerular filtration  rate (eGFR) is the CKD-EPI equation.        Eos # 0.0     0.1     Eosinophil% 0.2     0.5     Glucose 114     101     Glucose, UA     Negative       Gran # (ANC) 11.1     11.8     Gran% 77.1     78.1     Hematocrit 40.2     41.9     Hemoglobin 13.6     14.4     Hyaline Casts, UA     0       Immature Grans (Abs) 0.08  Comment:  Mild elevation in immature granulocytes is non specific and   can be seen in a variety of conditions including stress response,   acute inflammation, trauma and pregnancy. Correlation with other   laboratory and clinical findings is essential.       0.07  Comment:  Mild elevation in immature granulocytes is non specific and   can be seen in a variety of conditions including stress response,   acute inflammation, trauma and pregnancy. Correlation with other   laboratory and clinical findings is essential.       Immature Granulocytes 0.6     0.5     Ketones, UA     Negative       Lactate, Nav       1.0  Comment:  Falsely low lactic acid results can be found in samples   containing >=13.0 mg/dL total bilirubin and/or >=3.5 mg/dL   direct bilirubin.       Leukocytes, UA     2+       Lymph # 1.6     1.4     Lymph% 11.0     9.3     Magnesium 2.0           MCH 29.8     30.3     MCHC 33.8     34.4     MCV 88     88     Microscopic Comment     SEE COMMENT  Comment:  Other formed elements not mentioned in the report are not    present in the microscopic examination.          Mono # 1.5     1.7     Mono% 10.8     11.3     MPV 10.8     10.0     NITRITE UA     Positive       nRBC 0     0     Occult Blood UA     2+       pH, UA     5.0       Phosphorus 2.7           Platelets 283     271     Potassium 3.2     3.4     PROTEIN TOTAL 7.8     8.4     Protein, UA     2+  Comment:  Recommend a 24 hour urine protein or a urine   protein/creatinine ratio if globulin induced proteinuria is  clinically suspected.         RBC 4.57     4.75     RBC, UA     15       RDW 13.1     12.7     SARS-CoV-2 RNA, Amplification, Qual   Negative  Comment:  This test utilizes isothermal nucleic acid amplification   technology to detect the SARS-CoV-2 RdRp nucleic acid segment.   The analytical sensitivity (limit of detection) is 125 genome   equivalents/mL.   A POSITIVE result implies infection with the SARS-CoV-2 virus;  the patient is presumed to be contagious.    A NEGATIVE result means that SARS-CoV-2 nucleic acids are not  present above the limit of detection. It does not rule out the   possibility of COVID-19 and should not be the sole basis for   treatment decisions. If COVID-19 is strongly suspected based on  clinical and exposure history, re-testing should be considered.   This test is only for use under the Food and Drug   Administration s Emergency Use Authorization (EUA).   Commercial kits are provided by [employer].   Performance characteristics of the EUA have been independently  verified by [facility] Department of  Pathology and Laboratory Medicine.   _________________________________________________________________  The ID NOW COVID-19 Letter of Authorization, along with the   authorized Fact Sheet for Healthcare Providers, the authorized Fact  Sheet for Patients, and authorized labeling are available on the FDA   website:  www.fda.gov/MedicalDevices/Safety/EmergencySituations/ahe964623.htm           Sed Rate       >90     Sodium  135     135     Specific [place], UA     1.015       Specimen UA     Urine, Clean Catch       UROBILINOGEN UA     Negative       WBC, UA     >100       WBC 14.32     15.09         All pertinent labs within the past 24 hours have been reviewed.    Significant Imaging: I have reviewed and interpreted all pertinent imaging results/findings within the past 24 hours.      Assessment/Plan:      * Acute cystitis  Lab Results   Component Value Date    WBC 14.32 (H) 05/08/2020       No results found for this or any previous visit.  Results for orders placed or performed during the hospital encounter of 05/07/20   Urinalysis Microscopic   Result Value Ref Range    RBC, UA 15 (H) 0 - 4 /hpf    WBC, UA >100 (H) 0 - 5 /hpf    Bacteria Moderate (A) None-Occ /hpf    Hyaline Casts, UA 0 0-1/lpf /lpf    Microscopic Comment SEE COMMENT      Lactic acid 1.0  Urine and blood cultures pending  Cont IV Rocephin   hemodynamically stable    05/08/2020   Continue current antibiotics.  Change Rocephin to 1 g q.12 hours  Placement for patient on discharge.  Discussed with family further therapy options.    Alcohol abuse  Heavy daily alcohol use per family  Will start on valium taper  PRN ativan     Fall  Xray pending  Fall precautions    Septic encephalopathy  Secondary to above  Patient with worsening dementia   Baseline unclear. Will need to discuss with family.   CT head: pending  Cont neuro checks  Tele sitter  Fall precautions  Aspiration precautions  Delirium precautions:  Avoid antihistamines, anticholinergics, hypnotics, and minimize opiates while controlling for pain as these medications may exacerbate delirium. Cues for day/night will assist with keeping patient calm and oriented - during daytime, please keep adequate light in room (open windows, lights on) and please keep room dim at night-time to encourage normal sleep-wake cycles      VTE Risk Mitigation (From admission, onward)         Ordered     enoxaparin injection 40 mg   Daily      05/07/20 2214     IP VTE HIGH RISK PATIENT  Once      05/07/20 2214     Place sequential compression device  Until discontinued      05/07/20 2214                      Mohamud Fregoso MD  Department of Hospital Medicine   [facility]

## 2020-05-08 NOTE — ASSESSMENT & PLAN NOTE
Secondary to above  Patient with worsening dementia   Baseline unclear. Will need to discuss with family.   CT head: pending  Cont neuro checks  Tele sitter  Fall precautions  Aspiration precautions  Delirium precautions:  Avoid antihistamines, anticholinergics, hypnotics, and minimize opiates while controlling for pain as these medications may exacerbate delirium. Cues for day/night will assist with keeping patient calm and oriented - during daytime, please keep adequate light in room (open windows, lights on) and please keep room dim at night-time to encourage normal sleep-wake cycles

## 2020-05-08 NOTE — PLAN OF CARE
Problem: Adult Inpatient Plan of Care  Goal: Plan of Care Review  Outcome: Ongoing, Not Progressing     Problem: Fall Injury Risk  Goal: Absence of Fall and Fall-Related Injury  Outcome: Ongoing, Not Progressing     Problem: Adult Inpatient Plan of Care  Goal: Patient-Specific Goal (Individualization)  Outcome: Ongoing, Not Progressing     Problem: Infection  Goal: Infection Symptom Resolution  Outcome: Ongoing, Not Progressing     Problem: UTI (Urinary Tract Infection)  Goal: Improved Infection Symptoms  Outcome: Ongoing, Not Progressing     Problem: Skin Injury Risk Increased  Goal: Skin Health and Integrity  Outcome: Ongoing, Not Progressing    Mr. Cedeno is a 70 yr old male. Oriented to self. Disoriented x 3. Random thought processes. Full assist to bed. Incontinent of bowel and bladder. Had stool when transferred to floor. Bright red excoriation to groin and scrotum. Skin was dry and soiled. Free from breakdown. IV to left forearm secured with coban. Cath flushed with NS.  Call light in hand. Rails x 3. AVS sitter in use. Full Fall precautions in place.     0545- Temp. 101.1.  Medicated with Tylenol 650mg per eMAR. Disoriented x 3. Motions as if he is eating. Thinks he is eating peanut butter.

## 2020-05-08 NOTE — ASSESSMENT & PLAN NOTE
Lab Results   Component Value Date    WBC 15.09 (H) 05/07/2020       No results found for this or any previous visit.  Results for orders placed or performed during the hospital encounter of 05/07/20   Urinalysis Microscopic   Result Value Ref Range    RBC, UA 15 (H) 0 - 4 /hpf    WBC, UA >100 (H) 0 - 5 /hpf    Bacteria Moderate (A) None-Occ /hpf    Hyaline Casts, UA 0 0-1/lpf /lpf    Microscopic Comment SEE COMMENT      Lactic acid 1.0  Urine and blood cultures pending  Cont IV Rocephin   hemodynamically stable

## 2020-05-08 NOTE — ED NOTES
"RN speaks to patient's step daughter to obtain history. She states the patient is beginning to have worsening dementia and has declined in the last several months. She states that all he does is "sit in the recliner all day and pee on himself." She states that after this "corona stuff" settled down the family was going to try to place the patient in a nursing home/ assisted living. RN informs Dr. Schreiber of family's concern that the patient is unable to care for himself at home in his present condition.  "

## 2020-05-08 NOTE — ED NOTES
"Patient accusing RN of only "doing all this" to charge his insurance. RN explains that she is treating the patient for what he needs regardless of his insurance.  "

## 2020-05-08 NOTE — HPI
History of Present Illness:  Patient is a 70 y.o. male who has no past medical history on file presented to ED after EMS was called to patients house for reported fall. Patient is currently confused and agitated. He is oriented to self only and angry about being in an unfamiliar environment. He is poor historian and unable to provide history. No family at bedside. Patient states he feels fine. Denies any pain. No evidence of trauma on patient. He lives alone and per reports he is not able to care for himself. Family unable to provide assistance. He was found to have UTI and will be admitted for IV abx with possible SNF vs NH placement after discharge.

## 2020-05-08 NOTE — SUBJECTIVE & OBJECTIVE
History reviewed. No pertinent past medical history.    History reviewed. No pertinent surgical history.    Review of patient's allergies indicates:  No Known Allergies    No current facility-administered medications on file prior to encounter.      No current outpatient medications on file prior to encounter.     Family History     None        Tobacco Use    Smoking status: Never Smoker    Smokeless tobacco: Never Used   Substance and Sexual Activity    Alcohol use: Yes    Drug use: Never    Sexual activity: Not on file     Review of Systems   Unable to perform ROS: Mental status change     Objective:     Vital Signs (Most Recent):  Temp: (!) 101.4 °F (38.6 °C) (05/07/20 1834)  Pulse: 89 (05/07/20 2020)  Resp: 19 (05/07/20 2020)  BP: (!) 168/83 (05/07/20 2012)  SpO2: 96 % (05/07/20 2020) Vital Signs (24h Range):  Temp:  [101.4 °F (38.6 °C)] 101.4 °F (38.6 °C)  Pulse:  [88-90] 89  Resp:  [17-21] 19  SpO2:  [95 %-96 %] 96 %  BP: (156-186)/(79-98) 168/83     Weight: 113.4 kg (250 lb)  Body mass index is 35.87 kg/m².    Physical Exam   Constitutional: Vital signs are normal. He appears well-developed and well-nourished. He is uncooperative.   HENT:   Head: Normocephalic and atraumatic.   Cardiovascular: Normal rate.   Pulmonary/Chest: Effort normal.   Abdominal: Normal appearance.   Neurological: He is alert. He is disoriented.   Psychiatric: His speech is normal. His mood appears anxious. He is agitated. He is not actively hallucinating. Thought content is delusional. Cognition and memory are impaired. He expresses impulsivity.           Significant Labs:   Blood Culture: No results for input(s): LABBLOO in the last 48 hours.  CBC:   Recent Labs   Lab 05/07/20 1909   WBC 15.09*   HGB 14.4   HCT 41.9        CMP:   Recent Labs   Lab 05/07/20 1909   *   K 3.4*      CO2 24      BUN 15   CREATININE 1.0   CALCIUM 8.4*   PROT 8.4   ALBUMIN 3.7   BILITOT 1.3*   ALKPHOS 80   AST 17   ALT 21    ANIONGAP 11   EGFRNONAA >60.0     Cardiac Markers: No results for input(s): CKMB, MYOGLOBIN, BNP, TROPISTAT in the last 48 hours.  Coagulation: No results for input(s): PT, INR, APTT in the last 48 hours.  Lactic Acid:   Recent Labs   Lab 05/07/20  1909   LACTATE 1.0     Lipase: No results for input(s): LIPASE in the last 48 hours.  Lipid Panel: No results for input(s): CHOL, HDL, LDLCALC, TRIG, CHOLHDL in the last 48 hours.  Troponin: No results for input(s): TROPONINI in the last 48 hours.  TSH: No results for input(s): TSH in the last 4320 hours.  Urine Culture: No results for input(s): LABURIN in the last 48 hours.  Urine Studies:   Recent Labs   Lab 05/07/20  1931   COLORU Yellow   APPEARANCEUA Cloudy*   PHUR 5.0   SPECGRAV 1.015   PROTEINUA 2+*   GLUCUA Negative   KETONESU Negative   BILIRUBINUA Negative   OCCULTUA 2+*   NITRITE Positive*   UROBILINOGEN Negative   LEUKOCYTESUR 2+*   RBCUA 15*   WBCUA >100*   BACTERIA Moderate*   HYALINECASTS 0       Significant Imaging: I have reviewed all pertinent imaging results/findings within the past 24 hours.

## 2020-05-08 NOTE — SUBJECTIVE & OBJECTIVE
Interval History:     Review of Systems   Constitutional: Positive for fatigue and fever. Negative for activity change and appetite change.   HENT: Negative for congestion, ear discharge, mouth sores, nosebleeds, rhinorrhea, sinus pressure, sinus pain and tinnitus.    Eyes: Negative.  Negative for pain, redness and itching.   Respiratory: Positive for cough. Negative for apnea, choking, chest tightness, shortness of breath, wheezing and stridor.    Cardiovascular: Negative for chest pain, palpitations and leg swelling.   Gastrointestinal: Negative for abdominal distention, abdominal pain, anal bleeding, blood in stool, constipation and diarrhea.   Endocrine: Negative.    Genitourinary: Positive for difficulty urinating and dysuria. Negative for flank pain, frequency and urgency.   Musculoskeletal: Negative for arthralgias, back pain, gait problem and myalgias.   Skin: Negative for color change and pallor.   Allergic/Immunologic: Negative.    Neurological: Positive for dizziness and weakness. Negative for facial asymmetry, light-headedness and headaches.   Hematological: Negative for adenopathy. Does not bruise/bleed easily.   Psychiatric/Behavioral: Positive for confusion. The patient is nervous/anxious.      Objective:     Vital Signs (Most Recent):  Temp: 99.5 °F (37.5 °C) (05/08/20 1052)  Pulse: 73 (05/08/20 1052)  Resp: 18 (05/08/20 1052)  BP: (!) 163/67 (05/08/20 1052)  SpO2: (!) 93 % (05/08/20 1052) Vital Signs (24h Range):  Temp:  [97.8 °F (36.6 °C)-101.4 °F (38.6 °C)] 99.5 °F (37.5 °C)  Pulse:  [73-91] 73  Resp:  [17-21] 18  SpO2:  [93 %-97 %] 93 %  BP: (125-199)/(67-98) 163/67     Weight: 114.6 kg (252 lb 10.4 oz)  Body mass index is 36.25 kg/m².    Intake/Output Summary (Last 24 hours) at 5/8/2020 1137  Last data filed at 5/8/2020 1000  Gross per 24 hour   Intake 230 ml   Output 1 ml   Net 229 ml      Physical Exam   Constitutional: He is oriented to person, place, and time. He appears well-developed and  well-nourished.   HENT:   Head: Normocephalic and atraumatic.   Eyes: Pupils are equal, round, and reactive to light. EOM are normal.   Neck: Normal range of motion. Neck supple. No tracheal deviation present.   Cardiovascular: Normal rate, regular rhythm and normal heart sounds.   Pulmonary/Chest: Effort normal and breath sounds normal.   Abdominal: Soft. Bowel sounds are normal. He exhibits no distension. There is tenderness. There is guarding. There is no rebound.   Musculoskeletal: Normal range of motion.   Lymphadenopathy:     He has no cervical adenopathy.   Neurological: He is alert and oriented to person, place, and time.   Skin: Skin is warm and dry. Capillary refill takes less than 2 seconds.   Psychiatric: He has a normal mood and affect. His behavior is normal. Judgment and thought content normal.   Nursing note and vitals reviewed.      Significant Labs:   Recent Lab Results       05/08/20  0537   05/07/20  2117   05/07/20  1931   05/07/20  1909        Albumin 3.7     3.7     Alkaline Phosphatase 77     80     ALT 21     21     Anion Gap 10     11     Appearance, UA     Cloudy       AST 17     17     Bacteria, UA     Moderate       Baso # 0.04     0.05     Basophil% 0.3     0.3     Bilirubin (UA)     Negative       BILIRUBIN TOTAL 1.4  Comment:  For infants and newborns, interpretation of results should be based  on gestational age, weight and in agreement with clinical  observations.  Premature Infant recommended reference ranges:  Up to 24 hours.............<8.0 mg/dL  Up to 48 hours............<12.0 mg/dL  3-5 days..................<15.0 mg/dL  6-29 days.................<15.0 mg/dL       1.3  Comment:  For infants and newborns, interpretation of results should be based  on gestational age, weight and in agreement with clinical  observations.  Premature Infant recommended reference ranges:  Up to 24 hours.............<8.0 mg/dL  Up to 48 hours............<12.0 mg/dL  3-5 days..................<15.0  mg/dL  6-29 days.................<15.0 mg/dL       BUN, Bld 16     15     Calcium 8.3     8.4     Chloride 101     100     CO2 24     24     Color, UA     Yellow       CPK       52     Creatinine 1.0     1.0     CRP       14.69     Differential Method Automated     Automated     eGFR if  >60.0     >60.0     eGFR if non  >60.0  Comment:  Calculation used to obtain the estimated glomerular filtration  rate (eGFR) is the CKD-EPI equation.        >60.0  Comment:  Calculation used to obtain the estimated glomerular filtration  rate (eGFR) is the CKD-EPI equation.        Eos # 0.0     0.1     Eosinophil% 0.2     0.5     Glucose 114     101     Glucose, UA     Negative       Gran # (ANC) 11.1     11.8     Gran% 77.1     78.1     Hematocrit 40.2     41.9     Hemoglobin 13.6     14.4     Hyaline Casts, UA     0       Immature Grans (Abs) 0.08  Comment:  Mild elevation in immature granulocytes is non specific and   can be seen in a variety of conditions including stress response,   acute inflammation, trauma and pregnancy. Correlation with other   laboratory and clinical findings is essential.       0.07  Comment:  Mild elevation in immature granulocytes is non specific and   can be seen in a variety of conditions including stress response,   acute inflammation, trauma and pregnancy. Correlation with other   laboratory and clinical findings is essential.       Immature Granulocytes 0.6     0.5     Ketones, UA     Negative       Lactate, Nav       1.0  Comment:  Falsely low lactic acid results can be found in samples   containing >=13.0 mg/dL total bilirubin and/or >=3.5 mg/dL   direct bilirubin.       Leukocytes, UA     2+       Lymph # 1.6     1.4     Lymph% 11.0     9.3     Magnesium 2.0           MCH 29.8     30.3     MCHC 33.8     34.4     MCV 88     88     Microscopic Comment     SEE COMMENT  Comment:  Other formed elements not mentioned in the report are not   present in the microscopic  examination.          Mono # 1.5     1.7     Mono% 10.8     11.3     MPV 10.8     10.0     NITRITE UA     Positive       nRBC 0     0     Occult Blood UA     2+       pH, UA     5.0       Phosphorus 2.7           Platelets 283     271     Potassium 3.2     3.4     PROTEIN TOTAL 7.8     8.4     Protein, UA     2+  Comment:  Recommend a 24 hour urine protein or a urine   protein/creatinine ratio if globulin induced proteinuria is  clinically suspected.         RBC 4.57     4.75     RBC, UA     15       RDW 13.1     12.7     SARS-CoV-2 RNA, Amplification, Qual   Negative  Comment:  This test utilizes isothermal nucleic acid amplification   technology to detect the SARS-CoV-2 RdRp nucleic acid segment.   The analytical sensitivity (limit of detection) is 125 genome   equivalents/mL.   A POSITIVE result implies infection with the SARS-CoV-2 virus;  the patient is presumed to be contagious.    A NEGATIVE result means that SARS-CoV-2 nucleic acids are not  present above the limit of detection. It does not rule out the   possibility of COVID-19 and should not be the sole basis for   treatment decisions. If COVID-19 is strongly suspected based on  clinical and exposure history, re-testing should be considered.   This test is only for use under the Food and Drug   Administration s Emergency Use Authorization (EUA).   Commercial kits are provided by [employer].   Performance characteristics of the EUA have been independently  verified by [facility] Department of  Pathology and Laboratory Medicine.   _________________________________________________________________  The ID NOW COVID-19 Letter of Authorization, along with the   authorized Fact Sheet for Healthcare Providers, the authorized Fact  Sheet for Patients, and authorized labeling are available on the FDA   website:  www.fda.gov/MedicalDevices/Safety/EmergencySituations/jiw998062.htm           Sed Rate       >90     Sodium 135     135     Specific  [place], UA     1.015       Specimen UA     Urine, Clean Catch       UROBILINOGEN UA     Negative       WBC, UA     >100       WBC 14.32     15.09         All pertinent labs within the past 24 hours have been reviewed.    Significant Imaging: I have reviewed and interpreted all pertinent imaging results/findings within the past 24 hours.

## 2020-05-08 NOTE — ASSESSMENT & PLAN NOTE
Lab Results   Component Value Date    WBC 14.32 (H) 05/08/2020       No results found for this or any previous visit.  Results for orders placed or performed during the hospital encounter of 05/07/20   Urinalysis Microscopic   Result Value Ref Range    RBC, UA 15 (H) 0 - 4 /hpf    WBC, UA >100 (H) 0 - 5 /hpf    Bacteria Moderate (A) None-Occ /hpf    Hyaline Casts, UA 0 0-1/lpf /lpf    Microscopic Comment SEE COMMENT      Lactic acid 1.0  Urine and blood cultures pending  Cont IV Rocephin   hemodynamically stable    05/08/2020   Continue current antibiotics.  Change Rocephin to 1 g q.12 hours  Placement for patient on discharge.  Discussed with family further therapy options.

## 2020-05-08 NOTE — H&P
[facility] - ED Hospital Medicine  History & Physical    Patient Name: Waldo Cedeno  MRN: [id number]  Admission Date: 5/7/2020  Attending Physician: John Schreiber DO   Primary Care Provider: Primary Doctor No         Patient information was obtained from EMS personnel, past medical records and ER records.     Start time: 10:00 pm  Chief complaint: fall  The patient location is: [place]  Present with the patient at the time of the telemed/virtual assessment: Barbi    Subjective:     Principal Problem:Acute cystitis    Chief Complaint:   Chief Complaint   Patient presents with    Fall    Fatigue        HPI: History of Present Illness:  Patient is a 70 y.o. male who has no past medical history on file presented to ED after EMS was called to patients house for reported fall. Patient is currently confused and agitated. He is oriented to self only and angry about being in an unfamiliar environment. He is poor historian and unable to provide history. No family at bedside. Patient states he feels fine. Denies any pain. No evidence of trauma on patient. He lives alone and per reports he is not able to care for himself. Family unable to provide assistance. He was found to have UTI and will be admitted for IV abx with possible SNF vs NH placement after discharge.    History reviewed. No pertinent past medical history.    History reviewed. No pertinent surgical history.    Review of patient's allergies indicates:  No Known Allergies    No current facility-administered medications on file prior to encounter.      No current outpatient medications on file prior to encounter.     Family History     None        Tobacco Use    Smoking status: Never Smoker    Smokeless tobacco: Never Used   Substance and Sexual Activity    Alcohol use: Yes    Drug use: Never    Sexual activity: Not on file     Review of Systems   Unable to perform ROS: Mental status change     Objective:     Vital Signs (Most  Recent):  Temp: (!) 101.4 °F (38.6 °C) (05/07/20 1834)  Pulse: 89 (05/07/20 2020)  Resp: 19 (05/07/20 2020)  BP: (!) 168/83 (05/07/20 2012)  SpO2: 96 % (05/07/20 2020) Vital Signs (24h Range):  Temp:  [101.4 °F (38.6 °C)] 101.4 °F (38.6 °C)  Pulse:  [88-90] 89  Resp:  [17-21] 19  SpO2:  [95 %-96 %] 96 %  BP: (156-186)/(79-98) 168/83     Weight: 113.4 kg (250 lb)  Body mass index is 35.87 kg/m².    Physical Exam   Constitutional: Vital signs are normal. He appears well-developed and well-nourished. He is uncooperative.   HENT:   Head: Normocephalic and atraumatic.   Cardiovascular: Normal rate.   Pulmonary/Chest: Effort normal.   Abdominal: Normal appearance.   Neurological: He is alert. He is disoriented.   Psychiatric: His speech is normal. His mood appears anxious. He is agitated. He is not actively hallucinating. Thought content is delusional. Cognition and memory are impaired. He expresses impulsivity.           Significant Labs:   Blood Culture: No results for input(s): LABBLOO in the last 48 hours.  CBC:   Recent Labs   Lab 05/07/20 1909   WBC 15.09*   HGB 14.4   HCT 41.9        CMP:   Recent Labs   Lab 05/07/20 1909   *   K 3.4*      CO2 24      BUN 15   CREATININE 1.0   CALCIUM 8.4*   PROT 8.4   ALBUMIN 3.7   BILITOT 1.3*   ALKPHOS 80   AST 17   ALT 21   ANIONGAP 11   EGFRNONAA >60.0     Cardiac Markers: No results for input(s): CKMB, MYOGLOBIN, BNP, TROPISTAT in the last 48 hours.  Coagulation: No results for input(s): PT, INR, APTT in the last 48 hours.  Lactic Acid:   Recent Labs   Lab 05/07/20 1909   LACTATE 1.0     Lipase: No results for input(s): LIPASE in the last 48 hours.  Lipid Panel: No results for input(s): CHOL, HDL, LDLCALC, TRIG, CHOLHDL in the last 48 hours.  Troponin: No results for input(s): TROPONINI in the last 48 hours.  TSH: No results for input(s): TSH in the last 4320 hours.  Urine Culture: No results for input(s): LABURIN in the last 48 hours.  Urine  Studies:   Recent Labs   Lab 05/07/20  1931   COLORU Yellow   APPEARANCEUA Cloudy*   PHUR 5.0   SPECGRAV 1.015   PROTEINUA 2+*   GLUCUA Negative   KETONESU Negative   BILIRUBINUA Negative   OCCULTUA 2+*   NITRITE Positive*   UROBILINOGEN Negative   LEUKOCYTESUR 2+*   RBCUA 15*   WBCUA >100*   BACTERIA Moderate*   HYALINECASTS 0       Significant Imaging: I have reviewed all pertinent imaging results/findings within the past 24 hours.    Assessment/Plan:     * Acute cystitis  Lab Results   Component Value Date    WBC 15.09 (H) 05/07/2020       No results found for this or any previous visit.  Results for orders placed or performed during the hospital encounter of 05/07/20   Urinalysis Microscopic   Result Value Ref Range    RBC, UA 15 (H) 0 - 4 /hpf    WBC, UA >100 (H) 0 - 5 /hpf    Bacteria Moderate (A) None-Occ /hpf    Hyaline Casts, UA 0 0-1/lpf /lpf    Microscopic Comment SEE COMMENT      Lactic acid 1.0  Urine and blood cultures pending  Cont IV Rocephin   hemodynamically stable    Alcohol abuse  Heavy daily alcohol use per family  Will start on valium taper  PRN ativan     Fall  Xray pending  Fall precautions    Septic encephalopathy  Secondary to above  Patient with worsening dementia   Baseline unclear. Will need to discuss with family.   CT head: pending  Cont neuro checks  Tele sitter  Fall precautions  Aspiration precautions  Delirium precautions:  Avoid antihistamines, anticholinergics, hypnotics, and minimize opiates while controlling for pain as these medications may exacerbate delirium. Cues for day/night will assist with keeping patient calm and oriented - during daytime, please keep adequate light in room (open windows, lights on) and please keep room dim at night-time to encourage normal sleep-wake cycles      VTE Risk Mitigation (From admission, onward)         Ordered     enoxaparin injection 40 mg  Daily      05/07/20 2214     IP VTE HIGH RISK PATIENT  Once      05/07/20 2214     Place sequential  compression device  Until discontinued      05/07/20 2214                   End time:  10:30 p,    Total time spent with patient: 30 min    The attending portion of this evaluation, treatment, and documentation was performed per Mel Fuller MD via audiovisual.      Mel Fuller MD  Department of Hospital Medicine   [facility] - ED

## 2020-05-08 NOTE — ED NOTES
RN to bedside to reassess patient, RN finds patient sitting at end of stretcher naked with IV pulled out. Multiple RNs to bedside to assist patient back in stretcher. Patient agitated, states he wants to go home. RN explains that he has an infection and it is unsafe for him to be discharged home by himself at this time. Patient is frustrated by this, but verbalizes understanding for need of treatment.

## 2020-05-08 NOTE — PLAN OF CARE
05/08/20 1215   Medicare Message   Important Message from Medicare regarding Discharge Appeal Rights Given to patient/caregiver;Explained to patient/caregiver;Signed/date by patient/caregiver  (Verbal permission obtained from the pts brother. )   Date IMM was signed 05/08/20   Time IMM was signed 2274

## 2020-05-09 LAB
ALBUMIN SERPL BCP-MCNC: 3.2 G/DL (ref 3.5–5.2)
ALBUMIN SERPL BCP-MCNC: 3.3 G/DL (ref 3.5–5.2)
ALP SERPL-CCNC: 95 U/L (ref 55–135)
ALP SERPL-CCNC: 98 U/L (ref 55–135)
ALT SERPL W/O P-5'-P-CCNC: 26 U/L (ref 10–44)
ALT SERPL W/O P-5'-P-CCNC: 27 U/L (ref 10–44)
ANION GAP SERPL CALC-SCNC: 7 MMOL/L (ref 8–16)
ANION GAP SERPL CALC-SCNC: 7 MMOL/L (ref 8–16)
AST SERPL-CCNC: 25 U/L (ref 10–40)
AST SERPL-CCNC: 26 U/L (ref 10–40)
BASOPHILS # BLD AUTO: 0.04 K/UL (ref 0–0.2)
BASOPHILS # BLD AUTO: 0.04 K/UL (ref 0–0.2)
BASOPHILS NFR BLD: 0.5 % (ref 0–1.9)
BASOPHILS NFR BLD: 0.5 % (ref 0–1.9)
BILIRUB SERPL-MCNC: 0.6 MG/DL (ref 0.1–1)
BILIRUB SERPL-MCNC: 0.7 MG/DL (ref 0.1–1)
BUN SERPL-MCNC: 22 MG/DL (ref 8–23)
BUN SERPL-MCNC: 22 MG/DL (ref 8–23)
CALCIUM SERPL-MCNC: 8.6 MG/DL (ref 8.7–10.5)
CALCIUM SERPL-MCNC: 8.7 MG/DL (ref 8.7–10.5)
CHLORIDE SERPL-SCNC: 106 MMOL/L (ref 95–110)
CHLORIDE SERPL-SCNC: 107 MMOL/L (ref 95–110)
CO2 SERPL-SCNC: 26 MMOL/L (ref 23–29)
CO2 SERPL-SCNC: 27 MMOL/L (ref 23–29)
CREAT SERPL-MCNC: 0.8 MG/DL (ref 0.5–1.4)
CREAT SERPL-MCNC: 0.8 MG/DL (ref 0.5–1.4)
DIFFERENTIAL METHOD: ABNORMAL
DIFFERENTIAL METHOD: ABNORMAL
EOSINOPHIL # BLD AUTO: 0.4 K/UL (ref 0–0.5)
EOSINOPHIL # BLD AUTO: 0.4 K/UL (ref 0–0.5)
EOSINOPHIL NFR BLD: 4.3 % (ref 0–8)
EOSINOPHIL NFR BLD: 4.4 % (ref 0–8)
ERYTHROCYTE [DISTWIDTH] IN BLOOD BY AUTOMATED COUNT: 13.2 % (ref 11.5–14.5)
ERYTHROCYTE [DISTWIDTH] IN BLOOD BY AUTOMATED COUNT: 13.2 % (ref 11.5–14.5)
EST. GFR  (AFRICAN AMERICAN): >60 ML/MIN/1.73 M^2
EST. GFR  (AFRICAN AMERICAN): >60 ML/MIN/1.73 M^2
EST. GFR  (NON AFRICAN AMERICAN): >60 ML/MIN/1.73 M^2
EST. GFR  (NON AFRICAN AMERICAN): >60 ML/MIN/1.73 M^2
GLUCOSE SERPL-MCNC: 127 MG/DL (ref 70–110)
GLUCOSE SERPL-MCNC: 128 MG/DL (ref 70–110)
HCT VFR BLD AUTO: 41.6 % (ref 40–54)
HCT VFR BLD AUTO: 41.7 % (ref 40–54)
HGB BLD-MCNC: 13.6 G/DL (ref 14–18)
HGB BLD-MCNC: 13.7 G/DL (ref 14–18)
IMM GRANULOCYTES # BLD AUTO: 0.03 K/UL (ref 0–0.04)
IMM GRANULOCYTES # BLD AUTO: 0.05 K/UL (ref 0–0.04)
IMM GRANULOCYTES NFR BLD AUTO: 0.4 % (ref 0–0.5)
IMM GRANULOCYTES NFR BLD AUTO: 0.6 % (ref 0–0.5)
LYMPHOCYTES # BLD AUTO: 1.3 K/UL (ref 1–4.8)
LYMPHOCYTES # BLD AUTO: 1.3 K/UL (ref 1–4.8)
LYMPHOCYTES NFR BLD: 15.2 % (ref 18–48)
LYMPHOCYTES NFR BLD: 15.4 % (ref 18–48)
MAGNESIUM SERPL-MCNC: 2.3 MG/DL (ref 1.6–2.6)
MCH RBC QN AUTO: 29.6 PG (ref 27–31)
MCH RBC QN AUTO: 29.6 PG (ref 27–31)
MCHC RBC AUTO-ENTMCNC: 32.6 G/DL (ref 32–36)
MCHC RBC AUTO-ENTMCNC: 32.9 G/DL (ref 32–36)
MCV RBC AUTO: 90 FL (ref 82–98)
MCV RBC AUTO: 91 FL (ref 82–98)
MONOCYTES # BLD AUTO: 1 K/UL (ref 0.3–1)
MONOCYTES # BLD AUTO: 1.1 K/UL (ref 0.3–1)
MONOCYTES NFR BLD: 12.1 % (ref 4–15)
MONOCYTES NFR BLD: 12.8 % (ref 4–15)
NEUTROPHILS # BLD AUTO: 5.5 K/UL (ref 1.8–7.7)
NEUTROPHILS # BLD AUTO: 5.6 K/UL (ref 1.8–7.7)
NEUTROPHILS NFR BLD: 66.7 % (ref 38–73)
NEUTROPHILS NFR BLD: 67.1 % (ref 38–73)
NRBC BLD-RTO: 0 /100 WBC
NRBC BLD-RTO: 0 /100 WBC
PHOSPHATE SERPL-MCNC: 2.9 MG/DL (ref 2.7–4.5)
PLATELET # BLD AUTO: 277 K/UL (ref 150–350)
PLATELET # BLD AUTO: 278 K/UL (ref 150–350)
PMV BLD AUTO: 10.7 FL (ref 9.2–12.9)
PMV BLD AUTO: 10.8 FL (ref 9.2–12.9)
POTASSIUM SERPL-SCNC: 3.5 MMOL/L (ref 3.5–5.1)
POTASSIUM SERPL-SCNC: 3.5 MMOL/L (ref 3.5–5.1)
PROT SERPL-MCNC: 7.8 G/DL (ref 6–8.4)
PROT SERPL-MCNC: 7.9 G/DL (ref 6–8.4)
RBC # BLD AUTO: 4.59 M/UL (ref 4.6–6.2)
RBC # BLD AUTO: 4.63 M/UL (ref 4.6–6.2)
SODIUM SERPL-SCNC: 140 MMOL/L (ref 136–145)
SODIUM SERPL-SCNC: 140 MMOL/L (ref 136–145)
WBC # BLD AUTO: 8.22 K/UL (ref 3.9–12.7)
WBC # BLD AUTO: 8.33 K/UL (ref 3.9–12.7)

## 2020-05-09 PROCEDURE — 11000001 HC ACUTE MED/SURG PRIVATE ROOM

## 2020-05-09 PROCEDURE — 63600175 PHARM REV CODE 636 W HCPCS: Performed by: HOSPITALIST

## 2020-05-09 PROCEDURE — 25000003 PHARM REV CODE 250

## 2020-05-09 PROCEDURE — 25000003 PHARM REV CODE 250: Performed by: HOSPITALIST

## 2020-05-09 PROCEDURE — 94761 N-INVAS EAR/PLS OXIMETRY MLT: CPT

## 2020-05-09 PROCEDURE — 63600175 PHARM REV CODE 636 W HCPCS

## 2020-05-09 PROCEDURE — 99232 PR SUBSEQUENT HOSPITAL CARE,LEVL II: ICD-10-PCS | Mod: ,,, | Performed by: FAMILY MEDICINE

## 2020-05-09 PROCEDURE — 84100 ASSAY OF PHOSPHORUS: CPT

## 2020-05-09 PROCEDURE — 83735 ASSAY OF MAGNESIUM: CPT

## 2020-05-09 PROCEDURE — 94640 AIRWAY INHALATION TREATMENT: CPT

## 2020-05-09 PROCEDURE — 25000003 PHARM REV CODE 250: Performed by: INTERNAL MEDICINE

## 2020-05-09 PROCEDURE — 85025 COMPLETE CBC W/AUTO DIFF WBC: CPT

## 2020-05-09 PROCEDURE — 99232 SBSQ HOSP IP/OBS MODERATE 35: CPT | Mod: ,,, | Performed by: FAMILY MEDICINE

## 2020-05-09 PROCEDURE — 63600175 PHARM REV CODE 636 W HCPCS: Performed by: INTERNAL MEDICINE

## 2020-05-09 PROCEDURE — 80053 COMPREHEN METABOLIC PANEL: CPT

## 2020-05-09 PROCEDURE — 25000242 PHARM REV CODE 250 ALT 637 W/ HCPCS

## 2020-05-09 PROCEDURE — 36415 COLL VENOUS BLD VENIPUNCTURE: CPT

## 2020-05-09 PROCEDURE — 80053 COMPREHEN METABOLIC PANEL: CPT | Mod: 91

## 2020-05-09 RX ORDER — POTASSIUM CHLORIDE 20 MEQ/1
40 TABLET, EXTENDED RELEASE ORAL ONCE
Status: DISCONTINUED | OUTPATIENT
Start: 2020-05-09 | End: 2020-05-09

## 2020-05-09 RX ORDER — VANCOMYCIN HCL IN 5 % DEXTROSE 1G/250ML
PLASTIC BAG, INJECTION (ML) INTRAVENOUS
Status: COMPLETED
Start: 2020-05-09 | End: 2020-05-09

## 2020-05-09 RX ORDER — IPRATROPIUM BROMIDE AND ALBUTEROL SULFATE 2.5; .5 MG/3ML; MG/3ML
SOLUTION RESPIRATORY (INHALATION)
Status: COMPLETED
Start: 2020-05-09 | End: 2020-05-09

## 2020-05-09 RX ORDER — POTASSIUM CHLORIDE 7.45 MG/ML
10 INJECTION INTRAVENOUS ONCE
Status: DISCONTINUED | OUTPATIENT
Start: 2020-05-09 | End: 2020-05-09

## 2020-05-09 RX ADMIN — KETOROLAC TROMETHAMINE 15 MG: 30 INJECTION, SOLUTION INTRAMUSCULAR; INTRAVENOUS at 04:05

## 2020-05-09 RX ADMIN — CEFTRIAXONE SODIUM 1 G: 1 INJECTION, POWDER, FOR SOLUTION INTRAMUSCULAR; INTRAVENOUS at 12:05

## 2020-05-09 RX ADMIN — VANCOMYCIN HYDROCHLORIDE 1750 MG: 1 INJECTION, POWDER, LYOPHILIZED, FOR SOLUTION INTRAVENOUS at 08:05

## 2020-05-09 RX ADMIN — LORAZEPAM 1 MG: 2 INJECTION INTRAMUSCULAR; INTRAVENOUS at 08:05

## 2020-05-09 RX ADMIN — VANCOMYCIN HYDROCHLORIDE 2500 MG: 1 INJECTION, POWDER, LYOPHILIZED, FOR SOLUTION INTRAVENOUS at 09:05

## 2020-05-09 RX ADMIN — VANCOMYCIN HYDROCHLORIDE 1 G: 1 INJECTION, POWDER, LYOPHILIZED, FOR SOLUTION INTRAVENOUS at 09:05

## 2020-05-09 RX ADMIN — POTASSIUM CHLORIDE 20 MEQ: 1500 TABLET, EXTENDED RELEASE ORAL at 09:05

## 2020-05-09 RX ADMIN — SENNOSIDES AND DOCUSATE SODIUM 1 TABLET: 8.6; 5 TABLET ORAL at 09:05

## 2020-05-09 RX ADMIN — CLORAZEPATE DIPOTASSIUM 7.5 MG: 7.5 TABLET ORAL at 09:05

## 2020-05-09 RX ADMIN — DIAZEPAM 5 MG: 5 TABLET ORAL at 12:05

## 2020-05-09 RX ADMIN — POTASSIUM CHLORIDE 20 MEQ: 1500 TABLET, EXTENDED RELEASE ORAL at 08:05

## 2020-05-09 RX ADMIN — SENNOSIDES AND DOCUSATE SODIUM 1 TABLET: 8.6; 5 TABLET ORAL at 08:05

## 2020-05-09 RX ADMIN — IPRATROPIUM BROMIDE AND ALBUTEROL SULFATE 3 ML: .5; 3 SOLUTION RESPIRATORY (INHALATION) at 05:05

## 2020-05-09 RX ADMIN — LORAZEPAM 1 MG: 2 INJECTION INTRAMUSCULAR; INTRAVENOUS at 01:05

## 2020-05-09 RX ADMIN — ENOXAPARIN SODIUM 40 MG: 100 INJECTION SUBCUTANEOUS at 04:05

## 2020-05-09 RX ADMIN — CLORAZEPATE DIPOTASSIUM 7.5 MG: 7.5 TABLET ORAL at 04:05

## 2020-05-09 RX ADMIN — DIAZEPAM 5 MG: 5 TABLET ORAL at 09:05

## 2020-05-09 RX ADMIN — CLORAZEPATE DIPOTASSIUM 7.5 MG: 7.5 TABLET ORAL at 08:05

## 2020-05-09 NOTE — SUBJECTIVE & OBJECTIVE
Interval History: No acute change overnight    Review of Systems   Constitutional: Negative for fatigue and fever.   Respiratory: Negative for shortness of breath.    Cardiovascular: Negative for chest pain.   Gastrointestinal: Negative for abdominal pain.   Musculoskeletal: Negative.    Psychiatric/Behavioral: Negative for agitation.        Delirium/confusion     Objective:     Vital Signs (Most Recent):  Temp: 97.1 °F (36.2 °C) (05/09/20 1038)  Pulse: 80 (05/09/20 1038)  Resp: 18 (05/09/20 1038)  BP: (!) 147/67 (05/09/20 1038)  SpO2: (!) 94 % (05/09/20 1038) Vital Signs (24h Range):  Temp:  [97 °F (36.1 °C)-101.8 °F (38.8 °C)] 97.1 °F (36.2 °C)  Pulse:  [65-85] 80  Resp:  [16-18] 18  SpO2:  [92 %-98 %] 94 %  BP: (118-168)/(59-97) 147/67     Weight: 114.6 kg (252 lb 10.4 oz)  Body mass index is 36.25 kg/m².    Intake/Output Summary (Last 24 hours) at 5/9/2020 1129  Last data filed at 5/9/2020 0831  Gross per 24 hour   Intake 1300 ml   Output 200 ml   Net 1100 ml      Physical Exam   Constitutional: He appears well-developed and well-nourished. No distress.   HENT:   Head: Normocephalic and atraumatic.   Right Ear: External ear normal.   Left Ear: External ear normal.   Nose: Nose normal.   Eyes: Conjunctivae are normal.   Cardiovascular: Normal rate, regular rhythm, normal heart sounds and intact distal pulses.   No murmur heard.  Pulmonary/Chest: Effort normal and breath sounds normal.   Abdominal: Soft. Bowel sounds are normal. He exhibits no distension. There is no tenderness.   Skin: Skin is warm and dry. He is not diaphoretic.   Psychiatric:   Oriented to person, with some rambling thoughts, slightly restless/agitated but not combative, thought content - not coordinated   Vitals reviewed.      Significant Labs:   Recent Lab Results       05/09/20  0650   05/09/20  0649        Albumin 3.3 3.2     Alkaline Phosphatase 98 95     ALT 27 26     Anion Gap 7 7     AST 26 25     Baso # 0.04 0.04     Basophil% 0.5 0.5      BILIRUBIN TOTAL 0.6  Comment:  For infants and newborns, interpretation of results should be based  on gestational age, weight and in agreement with clinical  observations.  Premature Infant recommended reference ranges:  Up to 24 hours.............<8.0 mg/dL  Up to 48 hours............<12.0 mg/dL  3-5 days..................<15.0 mg/dL  6-29 days.................<15.0 mg/dL   0.7  Comment:  For infants and newborns, interpretation of results should be based  on gestational age, weight and in agreement with clinical  observations.  Premature Infant recommended reference ranges:  Up to 24 hours.............<8.0 mg/dL  Up to 48 hours............<12.0 mg/dL  3-5 days..................<15.0 mg/dL  6-29 days.................<15.0 mg/dL       BUN, Bld 22 22     Calcium 8.7 8.6     Chloride 107 106     CO2 26 27     Creatinine 0.8 0.8     Differential Method Automated Automated     eGFR if  >60.0 >60.0     eGFR if non  >60.0  Comment:  Calculation used to obtain the estimated glomerular filtration  rate (eGFR) is the CKD-EPI equation.    >60.0  Comment:  Calculation used to obtain the estimated glomerular filtration  rate (eGFR) is the CKD-EPI equation.        Eos # 0.4 0.4     Eosinophil% 4.3 4.4     Glucose 128 127     Gran # (ANC) 5.6 5.5     Gran% 67.1 66.7     Hematocrit 41.6 41.7     Hemoglobin 13.7 13.6     Immature Grans (Abs) 0.05  Comment:  Mild elevation in immature granulocytes is non specific and   can be seen in a variety of conditions including stress response,   acute inflammation, trauma and pregnancy. Correlation with other   laboratory and clinical findings is essential.   0.03  Comment:  Mild elevation in immature granulocytes is non specific and   can be seen in a variety of conditions including stress response,   acute inflammation, trauma and pregnancy. Correlation with other   laboratory and clinical findings is essential.       Immature Granulocytes 0.6 0.4      Lymph # 1.3 1.3     Lymph% 15.4 15.2     Magnesium 2.3       MCH 29.6 29.6     MCHC 32.9 32.6     MCV 90 91     Mono # 1.0 1.1     Mono% 12.1 12.8     MPV 10.7 10.8     nRBC 0 0     Phosphorus 2.9       Platelets 277 278     Potassium 3.5 3.5     PROTEIN TOTAL 7.8 7.9     RBC 4.63 4.59     RDW 13.2 13.2     Sodium 140 140     WBC 8.33 8.22         All pertinent labs within the past 24 hours have been reviewed.    Significant Imaging: I have reviewed and interpreted all pertinent imaging results/findings within the past 24 hours.

## 2020-05-09 NOTE — PROGRESS NOTES
[facility] Medicine  Progress Note    Patient Name: Waldo Cedeno  MRN: [id number]  Patient Class: IP- Inpatient   Admission Date: 5/7/2020  Length of Stay: 2 days  Attending Physician: Mohamud Fregoso MD  Primary Care Provider: Primary Doctor No        Subjective:     Principal Problem:Acute cystitis        HPI:  History of Present Illness:  Patient is a 70 y.o. male who has no past medical history on file presented to ED after EMS was called to patients house for reported fall. Patient is currently confused and agitated. He is oriented to self only and angry about being in an unfamiliar environment. He is poor historian and unable to provide history. No family at bedside. Patient states he feels fine. Denies any pain. No evidence of trauma on patient. He lives alone and per reports he is not able to care for himself. Family unable to provide assistance. He was found to have UTI and will be admitted for IV abx with possible SNF vs NH placement after discharge.    Overview/Hospital Course:  05/08/2020:  Patient is a 70-year-old male that was admitted last evening with reportedly having fallen at home and is unable to care for himself.  Patient states he does not know why he is here he feels fine.  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  Vital signs showed blood pressure mildly elevated 163/67 pulse 73 respirations 18 temperature 99.5° the and O2 sat 93%.  Patient showed T-max of 101.1° overnight.  Labs show a white blood cell count of 14.3 hemoglobin 13.6 hematocrit 40 differential shows 77 granulocytes 11 lymphocytes sed rate greater than 90.  Chemistry shows sodium 135 potassium 3.2 chloride 101 bicarb 24 anion gap 10 BUN 16 creatinine 1.0 and GFR greater than 60  CT of the brain revealed cortical atrophy deep white matter changes mild-to-moderate hydrocephalus.  Chest x-ray revealed no acute chest  disease.    5/9/2020  Patient stable. Denies any pain. Still with confusion but very talkative and alert. WBC has normalized. Labs are stable. Patient now awaiting placement. Will continue on IV antibiotics.    Interval History: No acute change overnight    Review of Systems   Constitutional: Negative for fatigue and fever.   Respiratory: Negative for shortness of breath.    Cardiovascular: Negative for chest pain.   Gastrointestinal: Negative for abdominal pain.   Musculoskeletal: Negative.    Psychiatric/Behavioral: Negative for agitation.        Delirium/confusion     Objective:     Vital Signs (Most Recent):  Temp: 97.1 °F (36.2 °C) (05/09/20 1038)  Pulse: 80 (05/09/20 1038)  Resp: 18 (05/09/20 1038)  BP: (!) 147/67 (05/09/20 1038)  SpO2: (!) 94 % (05/09/20 1038) Vital Signs (24h Range):  Temp:  [97 °F (36.1 °C)-101.8 °F (38.8 °C)] 97.1 °F (36.2 °C)  Pulse:  [65-85] 80  Resp:  [16-18] 18  SpO2:  [92 %-98 %] 94 %  BP: (118-168)/(59-97) 147/67     Weight: 114.6 kg (252 lb 10.4 oz)  Body mass index is 36.25 kg/m².    Intake/Output Summary (Last 24 hours) at 5/9/2020 1129  Last data filed at 5/9/2020 0831  Gross per 24 hour   Intake 1300 ml   Output 200 ml   Net 1100 ml      Physical Exam   Constitutional: He appears well-developed and well-nourished. No distress.   HENT:   Head: Normocephalic and atraumatic.   Right Ear: External ear normal.   Left Ear: External ear normal.   Nose: Nose normal.   Eyes: Conjunctivae are normal.   Cardiovascular: Normal rate, regular rhythm, normal heart sounds and intact distal pulses.   No murmur heard.  Pulmonary/Chest: Effort normal and breath sounds normal.   Abdominal: Soft. Bowel sounds are normal. He exhibits no distension. There is no tenderness.   Skin: Skin is warm and dry. He is not diaphoretic.   Psychiatric:   Oriented to person, with some rambling thoughts, slightly restless/agitated but not combative, thought content - not coordinated   Vitals  reviewed.      Significant Labs:   Recent Lab Results       05/09/20  0650   05/09/20  0649        Albumin 3.3 3.2     Alkaline Phosphatase 98 95     ALT 27 26     Anion Gap 7 7     AST 26 25     Baso # 0.04 0.04     Basophil% 0.5 0.5     BILIRUBIN TOTAL 0.6  Comment:  For infants and newborns, interpretation of results should be based  on gestational age, weight and in agreement with clinical  observations.  Premature Infant recommended reference ranges:  Up to 24 hours.............<8.0 mg/dL  Up to 48 hours............<12.0 mg/dL  3-5 days..................<15.0 mg/dL  6-29 days.................<15.0 mg/dL   0.7  Comment:  For infants and newborns, interpretation of results should be based  on gestational age, weight and in agreement with clinical  observations.  Premature Infant recommended reference ranges:  Up to 24 hours.............<8.0 mg/dL  Up to 48 hours............<12.0 mg/dL  3-5 days..................<15.0 mg/dL  6-29 days.................<15.0 mg/dL       BUN, Bld 22 22     Calcium 8.7 8.6     Chloride 107 106     CO2 26 27     Creatinine 0.8 0.8     Differential Method Automated Automated     eGFR if  >60.0 >60.0     eGFR if non  >60.0  Comment:  Calculation used to obtain the estimated glomerular filtration  rate (eGFR) is the CKD-EPI equation.    >60.0  Comment:  Calculation used to obtain the estimated glomerular filtration  rate (eGFR) is the CKD-EPI equation.        Eos # 0.4 0.4     Eosinophil% 4.3 4.4     Glucose 128 127     Gran # (ANC) 5.6 5.5     Gran% 67.1 66.7     Hematocrit 41.6 41.7     Hemoglobin 13.7 13.6     Immature Grans (Abs) 0.05  Comment:  Mild elevation in immature granulocytes is non specific and   can be seen in a variety of conditions including stress response,   acute inflammation, trauma and pregnancy. Correlation with other   laboratory and clinical findings is essential.   0.03  Comment:  Mild elevation in immature granulocytes is non  specific and   can be seen in a variety of conditions including stress response,   acute inflammation, trauma and pregnancy. Correlation with other   laboratory and clinical findings is essential.       Immature Granulocytes 0.6 0.4     Lymph # 1.3 1.3     Lymph% 15.4 15.2     Magnesium 2.3       MCH 29.6 29.6     MCHC 32.9 32.6     MCV 90 91     Mono # 1.0 1.1     Mono% 12.1 12.8     MPV 10.7 10.8     nRBC 0 0     Phosphorus 2.9       Platelets 277 278     Potassium 3.5 3.5     PROTEIN TOTAL 7.8 7.9     RBC 4.63 4.59     RDW 13.2 13.2     Sodium 140 140     WBC 8.33 8.22         All pertinent labs within the past 24 hours have been reviewed.    Significant Imaging: I have reviewed and interpreted all pertinent imaging results/findings within the past 24 hours.      Assessment/Plan:      * Acute cystitis  Lab Results   Component Value Date    WBC 8.33 05/09/2020       No results found for this or any previous visit.  Results for orders placed or performed during the hospital encounter of 05/07/20   Urinalysis Microscopic   Result Value Ref Range    RBC, UA 15 (H) 0 - 4 /hpf    WBC, UA >100 (H) 0 - 5 /hpf    Bacteria Moderate (A) None-Occ /hpf    Hyaline Casts, UA 0 0-1/lpf /lpf    Microscopic Comment SEE COMMENT      Lactic acid 1.0  Urine and blood cultures pending  Cont IV Rocephin   hemodynamically stable    05/08/2020   Continue current antibiotics.  Change Rocephin to 1 g q.12 hours  Placement for patient on discharge.  Discussed with family further therapy options.    5/9/2020  Continue IV antibiotics  Awaiting placement on discharge  WBC wnl    Alcohol abuse  Heavy daily alcohol use per family  Will start on valium taper  PRN ativan     Fall  Xray pending  Fall precautions    Septic encephalopathy  Secondary to above  Patient with worsening dementia   Baseline unclear. Will need to discuss with family.   CT head: pending  Cont neuro checks  Tele sitter  Fall precautions  Aspiration precautions  Delirium  precautions:  Avoid antihistamines, anticholinergics, hypnotics, and minimize opiates while controlling for pain as these medications may exacerbate delirium. Cues for day/night will assist with keeping patient calm and oriented - during daytime, please keep adequate light in room (open windows, lights on) and please keep room dim at night-time to encourage normal sleep-wake cycles    5/9/2020  Continue above measures for delirium/confusion  Fall precautions  Continue IV antibiotics  Labs have normalized      VTE Risk Mitigation (From admission, onward)         Ordered     enoxaparin injection 40 mg  Daily      05/07/20 2214     IP VTE HIGH RISK PATIENT  Once      05/07/20 2214     Place sequential compression device  Until discontinued      05/07/20 2214                      Marni Maldonado MD  Department of Hospital Medicine   [facility]

## 2020-05-09 NOTE — ASSESSMENT & PLAN NOTE
Lab Results   Component Value Date    WBC 8.33 05/09/2020       No results found for this or any previous visit.  Results for orders placed or performed during the hospital encounter of 05/07/20   Urinalysis Microscopic   Result Value Ref Range    RBC, UA 15 (H) 0 - 4 /hpf    WBC, UA >100 (H) 0 - 5 /hpf    Bacteria Moderate (A) None-Occ /hpf    Hyaline Casts, UA 0 0-1/lpf /lpf    Microscopic Comment SEE COMMENT      Lactic acid 1.0  Urine and blood cultures pending  Cont IV Rocephin   hemodynamically stable    05/08/2020   Continue current antibiotics.  Change Rocephin to 1 g q.12 hours  Placement for patient on discharge.  Discussed with family further therapy options.    5/9/2020  Continue IV antibiotics  Awaiting placement on discharge  WBC wnl

## 2020-05-09 NOTE — ASSESSMENT & PLAN NOTE
Secondary to above  Patient with worsening dementia   Baseline unclear. Will need to discuss with family.   CT head: pending  Cont neuro checks  Tele sitter  Fall precautions  Aspiration precautions  Delirium precautions:  Avoid antihistamines, anticholinergics, hypnotics, and minimize opiates while controlling for pain as these medications may exacerbate delirium. Cues for day/night will assist with keeping patient calm and oriented - during daytime, please keep adequate light in room (open windows, lights on) and please keep room dim at night-time to encourage normal sleep-wake cycles    5/9/2020  Continue above measures for delirium/confusion  Fall precautions  Continue IV antibiotics  Labs have normalized

## 2020-05-09 NOTE — NURSING
Patient provided with haircut and shave, maintains pleasant demeanor. Oriented to person, place, & situation at current moment. Currently playing games at nurses station with staff, no apparent signs of distress.

## 2020-05-10 LAB
ALBUMIN SERPL BCP-MCNC: 3.1 G/DL (ref 3.5–5.2)
ALP SERPL-CCNC: 81 U/L (ref 55–135)
ALT SERPL W/O P-5'-P-CCNC: 44 U/L (ref 10–44)
ANION GAP SERPL CALC-SCNC: 8 MMOL/L (ref 8–16)
AST SERPL-CCNC: 39 U/L (ref 10–40)
BACTERIA UR CULT: ABNORMAL
BILIRUB SERPL-MCNC: 0.7 MG/DL (ref 0.1–1)
BUN SERPL-MCNC: 18 MG/DL (ref 8–23)
CALCIUM SERPL-MCNC: 8.1 MG/DL (ref 8.7–10.5)
CHLORIDE SERPL-SCNC: 100 MMOL/L (ref 95–110)
CO2 SERPL-SCNC: 24 MMOL/L (ref 23–29)
CREAT SERPL-MCNC: 0.8 MG/DL (ref 0.5–1.4)
EST. GFR  (AFRICAN AMERICAN): >60 ML/MIN/1.73 M^2
EST. GFR  (NON AFRICAN AMERICAN): >60 ML/MIN/1.73 M^2
GLUCOSE SERPL-MCNC: 108 MG/DL (ref 70–110)
MAGNESIUM SERPL-MCNC: 2 MG/DL (ref 1.6–2.6)
PHOSPHATE SERPL-MCNC: 3.5 MG/DL (ref 2.7–4.5)
POTASSIUM SERPL-SCNC: 3.8 MMOL/L (ref 3.5–5.1)
PROT SERPL-MCNC: 7.6 G/DL (ref 6–8.4)
SODIUM SERPL-SCNC: 132 MMOL/L (ref 136–145)
VANCOMYCIN TROUGH SERPL-MCNC: 16.1 UG/ML (ref 10–22)

## 2020-05-10 PROCEDURE — 25000003 PHARM REV CODE 250: Performed by: FAMILY MEDICINE

## 2020-05-10 PROCEDURE — 99232 PR SUBSEQUENT HOSPITAL CARE,LEVL II: ICD-10-PCS | Mod: ,,, | Performed by: FAMILY MEDICINE

## 2020-05-10 PROCEDURE — 25000003 PHARM REV CODE 250: Performed by: INTERNAL MEDICINE

## 2020-05-10 PROCEDURE — 36415 COLL VENOUS BLD VENIPUNCTURE: CPT

## 2020-05-10 PROCEDURE — 94761 N-INVAS EAR/PLS OXIMETRY MLT: CPT

## 2020-05-10 PROCEDURE — 63600175 PHARM REV CODE 636 W HCPCS: Performed by: HOSPITALIST

## 2020-05-10 PROCEDURE — 99232 SBSQ HOSP IP/OBS MODERATE 35: CPT | Mod: ,,, | Performed by: FAMILY MEDICINE

## 2020-05-10 PROCEDURE — 25000242 PHARM REV CODE 250 ALT 637 W/ HCPCS: Performed by: FAMILY MEDICINE

## 2020-05-10 PROCEDURE — 25000003 PHARM REV CODE 250: Performed by: HOSPITALIST

## 2020-05-10 PROCEDURE — 80202 ASSAY OF VANCOMYCIN: CPT

## 2020-05-10 PROCEDURE — 94640 AIRWAY INHALATION TREATMENT: CPT

## 2020-05-10 PROCEDURE — 80053 COMPREHEN METABOLIC PANEL: CPT

## 2020-05-10 PROCEDURE — 25000242 PHARM REV CODE 250 ALT 637 W/ HCPCS: Performed by: HOSPITALIST

## 2020-05-10 PROCEDURE — 11000001 HC ACUTE MED/SURG PRIVATE ROOM

## 2020-05-10 PROCEDURE — 63600175 PHARM REV CODE 636 W HCPCS: Performed by: INTERNAL MEDICINE

## 2020-05-10 PROCEDURE — 84100 ASSAY OF PHOSPHORUS: CPT

## 2020-05-10 PROCEDURE — 63600175 PHARM REV CODE 636 W HCPCS: Performed by: FAMILY MEDICINE

## 2020-05-10 PROCEDURE — 83735 ASSAY OF MAGNESIUM: CPT

## 2020-05-10 RX ORDER — IPRATROPIUM BROMIDE AND ALBUTEROL SULFATE 2.5; .5 MG/3ML; MG/3ML
3 SOLUTION RESPIRATORY (INHALATION)
Status: DISCONTINUED | OUTPATIENT
Start: 2020-05-10 | End: 2020-05-15 | Stop reason: HOSPADM

## 2020-05-10 RX ORDER — AMLODIPINE BESYLATE 2.5 MG/1
5 TABLET ORAL DAILY
Status: DISCONTINUED | OUTPATIENT
Start: 2020-05-10 | End: 2020-05-15 | Stop reason: HOSPADM

## 2020-05-10 RX ORDER — HYDRALAZINE HYDROCHLORIDE 20 MG/ML
10 INJECTION INTRAMUSCULAR; INTRAVENOUS EVERY 6 HOURS PRN
Status: DISCONTINUED | OUTPATIENT
Start: 2020-05-10 | End: 2020-05-15 | Stop reason: HOSPADM

## 2020-05-10 RX ADMIN — CEFTRIAXONE SODIUM 1 G: 1 INJECTION, POWDER, FOR SOLUTION INTRAMUSCULAR; INTRAVENOUS at 12:05

## 2020-05-10 RX ADMIN — HYDRALAZINE HYDROCHLORIDE 10 MG: 20 INJECTION INTRAMUSCULAR; INTRAVENOUS at 11:05

## 2020-05-10 RX ADMIN — DIAZEPAM 5 MG: 5 TABLET ORAL at 05:05

## 2020-05-10 RX ADMIN — AMLODIPINE BESYLATE 5 MG: 2.5 TABLET ORAL at 09:05

## 2020-05-10 RX ADMIN — DIAZEPAM 5 MG: 5 TABLET ORAL at 01:05

## 2020-05-10 RX ADMIN — CLORAZEPATE DIPOTASSIUM 7.5 MG: 7.5 TABLET ORAL at 09:05

## 2020-05-10 RX ADMIN — CEFTRIAXONE SODIUM 1 G: 1 INJECTION, POWDER, FOR SOLUTION INTRAMUSCULAR; INTRAVENOUS at 01:05

## 2020-05-10 RX ADMIN — IPRATROPIUM BROMIDE AND ALBUTEROL SULFATE 3 ML: .5; 3 SOLUTION RESPIRATORY (INHALATION) at 01:05

## 2020-05-10 RX ADMIN — VANCOMYCIN HYDROCHLORIDE 1750 MG: 1 INJECTION, POWDER, LYOPHILIZED, FOR SOLUTION INTRAVENOUS at 08:05

## 2020-05-10 RX ADMIN — IPRATROPIUM BROMIDE AND ALBUTEROL SULFATE 3 ML: .5; 3 SOLUTION RESPIRATORY (INHALATION) at 07:05

## 2020-05-10 RX ADMIN — VANCOMYCIN HYDROCHLORIDE 1750 MG: 1 INJECTION, POWDER, LYOPHILIZED, FOR SOLUTION INTRAVENOUS at 09:05

## 2020-05-10 RX ADMIN — KETOROLAC TROMETHAMINE 15 MG: 30 INJECTION, SOLUTION INTRAMUSCULAR; INTRAVENOUS at 12:05

## 2020-05-10 RX ADMIN — LORAZEPAM 1 MG: 2 INJECTION INTRAMUSCULAR; INTRAVENOUS at 07:05

## 2020-05-10 RX ADMIN — ENOXAPARIN SODIUM 40 MG: 100 INJECTION SUBCUTANEOUS at 04:05

## 2020-05-10 RX ADMIN — CEFTRIAXONE SODIUM 1 G: 1 INJECTION, POWDER, FOR SOLUTION INTRAMUSCULAR; INTRAVENOUS at 11:05

## 2020-05-10 RX ADMIN — IPRATROPIUM BROMIDE AND ALBUTEROL SULFATE 3 ML: .5; 3 SOLUTION RESPIRATORY (INHALATION) at 06:05

## 2020-05-10 RX ADMIN — DIAZEPAM 5 MG: 5 TABLET ORAL at 09:05

## 2020-05-10 RX ADMIN — SENNOSIDES AND DOCUSATE SODIUM 1 TABLET: 8.6; 5 TABLET ORAL at 09:05

## 2020-05-10 RX ADMIN — CLORAZEPATE DIPOTASSIUM 7.5 MG: 7.5 TABLET ORAL at 03:05

## 2020-05-10 RX ADMIN — POTASSIUM CHLORIDE 20 MEQ: 1500 TABLET, EXTENDED RELEASE ORAL at 09:05

## 2020-05-10 NOTE — ASSESSMENT & PLAN NOTE
Secondary to above  Patient with worsening dementia   Baseline unclear. Will need to discuss with family.   CT head: pending  Cont neuro checks  Tele sitter  Fall precautions  Aspiration precautions  Delirium precautions:  Avoid antihistamines, anticholinergics, hypnotics, and minimize opiates while controlling for pain as these medications may exacerbate delirium. Cues for day/night will assist with keeping patient calm and oriented - during daytime, please keep adequate light in room (open windows, lights on) and please keep room dim at night-time to encourage normal sleep-wake cycles    5/9/2020  Continue above measures for delirium/confusion  Fall precautions  Continue IV antibiotics  Labs have normalized    5/10/2020  Patient with dementia, delirium  Stable but no change in mental status from prior exams

## 2020-05-10 NOTE — SUBJECTIVE & OBJECTIVE
Interval History: No acute events overnight    Review of Systems   Constitutional: Negative for fatigue and fever.   HENT: Negative.    Eyes: Negative.    Respiratory: Negative for shortness of breath.    Cardiovascular: Negative for chest pain.   Gastrointestinal: Negative for abdominal pain.   Musculoskeletal: Negative.    Psychiatric/Behavioral: Negative for agitation.        Delirium/confusion     Objective:     Vital Signs (Most Recent):  Temp: 97.4 °F (36.3 °C) (05/10/20 1023)  Pulse: 73 (05/10/20 1023)  Resp: 18 (05/10/20 1023)  BP: (!) 175/80 (05/10/20 1023)  SpO2: 98 % (05/10/20 1023) Vital Signs (24h Range):  Temp:  [96.1 °F (35.6 °C)-98.4 °F (36.9 °C)] 97.4 °F (36.3 °C)  Pulse:  [62-83] 73  Resp:  [17-18] 18  SpO2:  [93 %-98 %] 98 %  BP: (139-176)/(74-80) 175/80     Weight: 114.6 kg (252 lb 10.4 oz)  Body mass index is 36.25 kg/m².    Intake/Output Summary (Last 24 hours) at 5/10/2020 1144  Last data filed at 5/10/2020 0600  Gross per 24 hour   Intake 1060 ml   Output --   Net 1060 ml      Physical Exam   Constitutional: He appears well-developed and well-nourished. No distress.   HENT:   Head: Normocephalic and atraumatic.   Right Ear: External ear normal.   Left Ear: External ear normal.   Nose: Nose normal.   Eyes: Conjunctivae are normal.   Cardiovascular: Normal rate, regular rhythm, normal heart sounds and intact distal pulses.   No murmur heard.  Pulmonary/Chest: Effort normal. He has wheezes (end expiratory wheezes).   Abdominal: Soft. Bowel sounds are normal. He exhibits no distension. There is no tenderness.   Neurological: He is alert.   Skin: Skin is warm and dry. He is not diaphoretic.   Psychiatric:   Oriented to person, still with rambling though processes, delirium present   Vitals reviewed.      Significant Labs:   Recent Lab Results     None        All pertinent labs within the past 24 hours have been reviewed.    Significant Imaging: I have reviewed and interpreted all pertinent imaging  results/findings within the past 24 hours.

## 2020-05-10 NOTE — ASSESSMENT & PLAN NOTE
Lab Results   Component Value Date    WBC 8.33 05/09/2020       No results found for this or any previous visit.  Results for orders placed or performed during the hospital encounter of 05/07/20   Urinalysis Microscopic   Result Value Ref Range    RBC, UA 15 (H) 0 - 4 /hpf    WBC, UA >100 (H) 0 - 5 /hpf    Bacteria Moderate (A) None-Occ /hpf    Hyaline Casts, UA 0 0-1/lpf /lpf    Microscopic Comment SEE COMMENT      Lactic acid 1.0  Urine and blood cultures pending  Cont IV Rocephin   hemodynamically stable    05/08/2020   Continue current antibiotics.  Change Rocephin to 1 g q.12 hours  Placement for patient on discharge.  Discussed with family further therapy options.    5/9/2020  Continue IV antibiotics  Awaiting placement on discharge  WBC wnl    5/10/2020  Patient stable, WBC wnl  Continue current therapy

## 2020-05-10 NOTE — PROGRESS NOTES
[facility] Medicine  Progress Note    Patient Name: Waldo Cedeno  MRN: [id number]  Patient Class: IP- Inpatient   Admission Date: 5/7/2020  Length of Stay: 3 days  Attending Physician: Mohamud Fregoso MD  Primary Care Provider: Primary Doctor No        Subjective:     Principal Problem:Acute cystitis        HPI:  History of Present Illness:  Patient is a 70 y.o. male who has no past medical history on file presented to ED after EMS was called to patients house for reported fall. Patient is currently confused and agitated. He is oriented to self only and angry about being in an unfamiliar environment. He is poor historian and unable to provide history. No family at bedside. Patient states he feels fine. Denies any pain. No evidence of trauma on patient. He lives alone and per reports he is not able to care for himself. Family unable to provide assistance. He was found to have UTI and will be admitted for IV abx with possible SNF vs NH placement after discharge.    Overview/Hospital Course:  05/08/2020:  Patient is a 70-year-old male that was admitted last evening with reportedly having fallen at home and is unable to care for himself.  Patient states he does not know why he is here he feels fine.  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  Vital signs showed blood pressure mildly elevated 163/67 pulse 73 respirations 18 temperature 99.5° the and O2 sat 93%.  Patient showed T-max of 101.1° overnight.  Labs show a white blood cell count of 14.3 hemoglobin 13.6 hematocrit 40 differential shows 77 granulocytes 11 lymphocytes sed rate greater than 90.  Chemistry shows sodium 135 potassium 3.2 chloride 101 bicarb 24 anion gap 10 BUN 16 creatinine 1.0 and GFR greater than 60  CT of the brain revealed cortical atrophy deep white matter changes mild-to-moderate hydrocephalus.  Chest x-ray revealed no acute chest  disease.    5/9/2020  Patient stable. Denies any pain. Still with confusion but very talkative and alert. WBC has normalized. Labs are stable. Patient now awaiting placement. Will continue on IV antibiotics.    5/10/2020  Patient stable. No change in care plan.     Interval History: No acute events overnight    Review of Systems   Constitutional: Negative for fatigue and fever.   HENT: Negative.    Eyes: Negative.    Respiratory: Negative for shortness of breath.    Cardiovascular: Negative for chest pain.   Gastrointestinal: Negative for abdominal pain.   Musculoskeletal: Negative.    Psychiatric/Behavioral: Negative for agitation.        Delirium/confusion     Objective:     Vital Signs (Most Recent):  Temp: 97.4 °F (36.3 °C) (05/10/20 1023)  Pulse: 73 (05/10/20 1023)  Resp: 18 (05/10/20 1023)  BP: (!) 175/80 (05/10/20 1023)  SpO2: 98 % (05/10/20 1023) Vital Signs (24h Range):  Temp:  [96.1 °F (35.6 °C)-98.4 °F (36.9 °C)] 97.4 °F (36.3 °C)  Pulse:  [62-83] 73  Resp:  [17-18] 18  SpO2:  [93 %-98 %] 98 %  BP: (139-176)/(74-80) 175/80     Weight: 114.6 kg (252 lb 10.4 oz)  Body mass index is 36.25 kg/m².    Intake/Output Summary (Last 24 hours) at 5/10/2020 1144  Last data filed at 5/10/2020 0600  Gross per 24 hour   Intake 1060 ml   Output --   Net 1060 ml      Physical Exam   Constitutional: He appears well-developed and well-nourished. No distress.   HENT:   Head: Normocephalic and atraumatic.   Right Ear: External ear normal.   Left Ear: External ear normal.   Nose: Nose normal.   Eyes: Conjunctivae are normal.   Cardiovascular: Normal rate, regular rhythm, normal heart sounds and intact distal pulses.   No murmur heard.  Pulmonary/Chest: Effort normal. He has wheezes (end expiratory wheezes).   Abdominal: Soft. Bowel sounds are normal. He exhibits no distension. There is no tenderness.   Neurological: He is alert.   Skin: Skin is warm and dry. He is not diaphoretic.   Psychiatric:   Oriented to person, still  with rambling though processes, delirium present   Vitals reviewed.      Significant Labs:   Recent Lab Results     None        All pertinent labs within the past 24 hours have been reviewed.    Significant Imaging: I have reviewed and interpreted all pertinent imaging results/findings within the past 24 hours.      Assessment/Plan:      * Acute cystitis  Lab Results   Component Value Date    WBC 8.33 05/09/2020       No results found for this or any previous visit.  Results for orders placed or performed during the hospital encounter of 05/07/20   Urinalysis Microscopic   Result Value Ref Range    RBC, UA 15 (H) 0 - 4 /hpf    WBC, UA >100 (H) 0 - 5 /hpf    Bacteria Moderate (A) None-Occ /hpf    Hyaline Casts, UA 0 0-1/lpf /lpf    Microscopic Comment SEE COMMENT      Lactic acid 1.0  Urine and blood cultures pending  Cont IV Rocephin   hemodynamically stable    05/08/2020   Continue current antibiotics.  Change Rocephin to 1 g q.12 hours  Placement for patient on discharge.  Discussed with family further therapy options.    5/9/2020  Continue IV antibiotics  Awaiting placement on discharge  WBC wnl    5/10/2020  Patient stable, WBC wnl  Continue current therapy    Alcohol abuse  Heavy daily alcohol use per family  Will start on valium taper  PRN ativan     Fall  Xray pending  Fall precautions    Septic encephalopathy  Secondary to above  Patient with worsening dementia   Baseline unclear. Will need to discuss with family.   CT head: pending  Cont neuro checks  Tele sitter  Fall precautions  Aspiration precautions  Delirium precautions:  Avoid antihistamines, anticholinergics, hypnotics, and minimize opiates while controlling for pain as these medications may exacerbate delirium. Cues for day/night will assist with keeping patient calm and oriented - during daytime, please keep adequate light in room (open windows, lights on) and please keep room dim at night-time to encourage normal sleep-wake  cycles    5/9/2020  Continue above measures for delirium/confusion  Fall precautions  Continue IV antibiotics  Labs have normalized    5/10/2020  Patient with dementia, delirium  Stable but no change in mental status from prior exams        VTE Risk Mitigation (From admission, onward)         Ordered     enoxaparin injection 40 mg  Daily      05/07/20 2214     IP VTE HIGH RISK PATIENT  Once      05/07/20 2214     Place sequential compression device  Until discontinued      05/07/20 2214                      Marni Maldonado MD  Department of Hospital Medicine   [facility]

## 2020-05-11 LAB
ALBUMIN SERPL BCP-MCNC: 3.2 G/DL (ref 3.5–5.2)
ALP SERPL-CCNC: 76 U/L (ref 55–135)
ALT SERPL W/O P-5'-P-CCNC: 40 U/L (ref 10–44)
ANION GAP SERPL CALC-SCNC: 8 MMOL/L (ref 8–16)
AST SERPL-CCNC: 33 U/L (ref 10–40)
BACTERIA BLD CULT: ABNORMAL
BASOPHILS # BLD AUTO: 0.03 K/UL (ref 0–0.2)
BASOPHILS NFR BLD: 0.4 % (ref 0–1.9)
BILIRUB SERPL-MCNC: 0.2 MG/DL (ref 0.1–1)
BUN SERPL-MCNC: 14 MG/DL (ref 8–23)
CALCIUM SERPL-MCNC: 8.3 MG/DL (ref 8.7–10.5)
CHLORIDE SERPL-SCNC: 104 MMOL/L (ref 95–110)
CO2 SERPL-SCNC: 25 MMOL/L (ref 23–29)
CREAT SERPL-MCNC: 0.7 MG/DL (ref 0.5–1.4)
DIFFERENTIAL METHOD: ABNORMAL
EOSINOPHIL # BLD AUTO: 0.5 K/UL (ref 0–0.5)
EOSINOPHIL NFR BLD: 6.2 % (ref 0–8)
ERYTHROCYTE [DISTWIDTH] IN BLOOD BY AUTOMATED COUNT: 12.9 % (ref 11.5–14.5)
EST. GFR  (AFRICAN AMERICAN): >60 ML/MIN/1.73 M^2
EST. GFR  (NON AFRICAN AMERICAN): >60 ML/MIN/1.73 M^2
GLUCOSE SERPL-MCNC: 100 MG/DL (ref 70–110)
HCT VFR BLD AUTO: 39.1 % (ref 40–54)
HGB BLD-MCNC: 13.1 G/DL (ref 14–18)
IMM GRANULOCYTES # BLD AUTO: 0.04 K/UL (ref 0–0.04)
IMM GRANULOCYTES NFR BLD AUTO: 0.5 % (ref 0–0.5)
LYMPHOCYTES # BLD AUTO: 1.7 K/UL (ref 1–4.8)
LYMPHOCYTES NFR BLD: 19.5 % (ref 18–48)
MAGNESIUM SERPL-MCNC: 1.9 MG/DL (ref 1.6–2.6)
MCH RBC QN AUTO: 29.7 PG (ref 27–31)
MCHC RBC AUTO-ENTMCNC: 33.5 G/DL (ref 32–36)
MCV RBC AUTO: 89 FL (ref 82–98)
MONOCYTES # BLD AUTO: 0.8 K/UL (ref 0.3–1)
MONOCYTES NFR BLD: 9.8 % (ref 4–15)
NEUTROPHILS # BLD AUTO: 5.4 K/UL (ref 1.8–7.7)
NEUTROPHILS NFR BLD: 63.6 % (ref 38–73)
NRBC BLD-RTO: 0 /100 WBC
PHOSPHATE SERPL-MCNC: 3.1 MG/DL (ref 2.7–4.5)
PLATELET # BLD AUTO: 306 K/UL (ref 150–350)
PMV BLD AUTO: 10.9 FL (ref 9.2–12.9)
POTASSIUM SERPL-SCNC: 3.7 MMOL/L (ref 3.5–5.1)
PROT SERPL-MCNC: 7.4 G/DL (ref 6–8.4)
RBC # BLD AUTO: 4.41 M/UL (ref 4.6–6.2)
SODIUM SERPL-SCNC: 137 MMOL/L (ref 136–145)
VANCOMYCIN TROUGH SERPL-MCNC: 20.3 UG/ML (ref 10–22)
WBC # BLD AUTO: 8.53 K/UL (ref 3.9–12.7)

## 2020-05-11 PROCEDURE — 25000003 PHARM REV CODE 250: Performed by: HOSPITALIST

## 2020-05-11 PROCEDURE — 80053 COMPREHEN METABOLIC PANEL: CPT

## 2020-05-11 PROCEDURE — 25000242 PHARM REV CODE 250 ALT 637 W/ HCPCS: Performed by: FAMILY MEDICINE

## 2020-05-11 PROCEDURE — 25000003 PHARM REV CODE 250

## 2020-05-11 PROCEDURE — 25000003 PHARM REV CODE 250: Performed by: FAMILY MEDICINE

## 2020-05-11 PROCEDURE — 94640 AIRWAY INHALATION TREATMENT: CPT

## 2020-05-11 PROCEDURE — 85025 COMPLETE CBC W/AUTO DIFF WBC: CPT

## 2020-05-11 PROCEDURE — 99232 SBSQ HOSP IP/OBS MODERATE 35: CPT | Mod: ,,, | Performed by: INTERNAL MEDICINE

## 2020-05-11 PROCEDURE — 63600175 PHARM REV CODE 636 W HCPCS

## 2020-05-11 PROCEDURE — 25000242 PHARM REV CODE 250 ALT 637 W/ HCPCS

## 2020-05-11 PROCEDURE — 63600175 PHARM REV CODE 636 W HCPCS: Performed by: HOSPITALIST

## 2020-05-11 PROCEDURE — 99232 PR SUBSEQUENT HOSPITAL CARE,LEVL II: ICD-10-PCS | Mod: ,,, | Performed by: INTERNAL MEDICINE

## 2020-05-11 PROCEDURE — 36415 COLL VENOUS BLD VENIPUNCTURE: CPT

## 2020-05-11 PROCEDURE — 80202 ASSAY OF VANCOMYCIN: CPT

## 2020-05-11 PROCEDURE — 94761 N-INVAS EAR/PLS OXIMETRY MLT: CPT

## 2020-05-11 PROCEDURE — 25000003 PHARM REV CODE 250: Performed by: INTERNAL MEDICINE

## 2020-05-11 PROCEDURE — 83735 ASSAY OF MAGNESIUM: CPT

## 2020-05-11 PROCEDURE — 63600175 PHARM REV CODE 636 W HCPCS: Performed by: INTERNAL MEDICINE

## 2020-05-11 PROCEDURE — 84100 ASSAY OF PHOSPHORUS: CPT

## 2020-05-11 PROCEDURE — 11000001 HC ACUTE MED/SURG PRIVATE ROOM

## 2020-05-11 RX ORDER — IPRATROPIUM BROMIDE AND ALBUTEROL SULFATE 2.5; .5 MG/3ML; MG/3ML
SOLUTION RESPIRATORY (INHALATION)
Status: COMPLETED
Start: 2020-05-11 | End: 2020-05-11

## 2020-05-11 RX ORDER — CLORAZEPATE DIPOTASSIUM 7.5 MG/1
TABLET ORAL
Status: DISPENSED
Start: 2020-05-11 | End: 2020-05-12

## 2020-05-11 RX ADMIN — VANCOMYCIN HYDROCHLORIDE 500 MG: 500 INJECTION, POWDER, LYOPHILIZED, FOR SOLUTION INTRAVENOUS at 10:05

## 2020-05-11 RX ADMIN — CEFTRIAXONE SODIUM 1 G: 1 INJECTION, POWDER, FOR SOLUTION INTRAMUSCULAR; INTRAVENOUS at 01:05

## 2020-05-11 RX ADMIN — CLORAZEPATE DIPOTASSIUM 7.5 MG: 7.5 TABLET ORAL at 10:05

## 2020-05-11 RX ADMIN — IPRATROPIUM BROMIDE AND ALBUTEROL SULFATE 3 ML: .5; 3 SOLUTION RESPIRATORY (INHALATION) at 04:05

## 2020-05-11 RX ADMIN — IPRATROPIUM BROMIDE AND ALBUTEROL SULFATE 3 ML: .5; 3 SOLUTION RESPIRATORY (INHALATION) at 07:05

## 2020-05-11 RX ADMIN — SENNOSIDES AND DOCUSATE SODIUM 1 TABLET: 8.6; 5 TABLET ORAL at 08:05

## 2020-05-11 RX ADMIN — POTASSIUM CHLORIDE 20 MEQ: 1500 TABLET, EXTENDED RELEASE ORAL at 09:05

## 2020-05-11 RX ADMIN — VANCOMYCIN HYDROCHLORIDE 1500 MG: 1.5 INJECTION, POWDER, LYOPHILIZED, FOR SOLUTION INTRAVENOUS at 10:05

## 2020-05-11 RX ADMIN — DIAZEPAM 5 MG: 5 TABLET ORAL at 10:05

## 2020-05-11 RX ADMIN — LORAZEPAM 1 MG: 2 INJECTION INTRAMUSCULAR; INTRAVENOUS at 06:05

## 2020-05-11 RX ADMIN — DIAZEPAM 5 MG: 5 TABLET ORAL at 01:05

## 2020-05-11 RX ADMIN — SENNOSIDES AND DOCUSATE SODIUM 1 TABLET: 8.6; 5 TABLET ORAL at 09:05

## 2020-05-11 RX ADMIN — IPRATROPIUM BROMIDE AND ALBUTEROL SULFATE 3 ML: .5; 3 SOLUTION RESPIRATORY (INHALATION) at 01:05

## 2020-05-11 RX ADMIN — IPRATROPIUM BROMIDE AND ALBUTEROL SULFATE 3 ML: .5; 3 SOLUTION RESPIRATORY (INHALATION) at 09:05

## 2020-05-11 RX ADMIN — AMLODIPINE BESYLATE 5 MG: 2.5 TABLET ORAL at 08:05

## 2020-05-11 RX ADMIN — CLORAZEPATE DIPOTASSIUM 7.5 MG: 7.5 TABLET ORAL at 08:05

## 2020-05-11 RX ADMIN — LORAZEPAM 1 MG: 2 INJECTION INTRAMUSCULAR; INTRAVENOUS at 03:05

## 2020-05-11 RX ADMIN — LORAZEPAM 1 MG: 2 INJECTION INTRAMUSCULAR; INTRAVENOUS at 09:05

## 2020-05-11 RX ADMIN — VANCOMYCIN HYDROCHLORIDE 1750 MG: 1 INJECTION, POWDER, LYOPHILIZED, FOR SOLUTION INTRAVENOUS at 09:05

## 2020-05-11 RX ADMIN — CLORAZEPATE DIPOTASSIUM 7.5 MG: 7.5 TABLET ORAL at 05:05

## 2020-05-11 RX ADMIN — DIAZEPAM 5 MG: 5 TABLET ORAL at 06:05

## 2020-05-11 RX ADMIN — POTASSIUM CHLORIDE 20 MEQ: 1500 TABLET, EXTENDED RELEASE ORAL at 08:05

## 2020-05-11 NOTE — PT/OT/SLP PROGRESS
After speaking with nursing staff, no PT treatment given today. Patient has had a rough day and is currently sleeping. Will continue daily treatment until patient is D/C'd.    Jonathan Favre, PTA

## 2020-05-11 NOTE — PLAN OF CARE
Problem: Fall Injury Risk  Goal: Absence of Fall and Fall-Related Injury  Outcome: Ongoing, Progressing     Problem: Adult Inpatient Plan of Care  Goal: Optimal Comfort and Wellbeing  Outcome: Ongoing, Progressing     Problem: Infection  Goal: Infection Symptom Resolution  Outcome: Ongoing, Progressing     Problem: Skin Injury Risk Increased  Goal: Skin Health and Integrity  Outcome: Ongoing, Progressing     Problem: UTI (Urinary Tract Infection)  Goal: Improved Infection Symptoms  Outcome: Ongoing, Progressing

## 2020-05-11 NOTE — ASSESSMENT & PLAN NOTE
Heavy daily alcohol use per family  Will start on valium taper  PRN ativan     05/11/2020:  Continue anxiolytics of transient 50 mg b.i.d.  Follow-up labs as needed.  Await for transfer to long term care facility hopefully today patient is ready for discharge

## 2020-05-11 NOTE — SUBJECTIVE & OBJECTIVE
Interval History:     Review of Systems   Constitutional: Negative for activity change, appetite change, fatigue and fever.   HENT: Negative for congestion, ear discharge, mouth sores, nosebleeds, rhinorrhea, sinus pressure, sinus pain and tinnitus.    Eyes: Negative.  Negative for pain, redness and itching.   Respiratory: Positive for shortness of breath. Negative for apnea, cough, choking, chest tightness, wheezing and stridor.    Cardiovascular: Negative for chest pain, palpitations and leg swelling.   Gastrointestinal: Negative for abdominal distention, abdominal pain, anal bleeding, blood in stool, constipation and diarrhea.   Endocrine: Negative.    Genitourinary: Positive for decreased urine volume and difficulty urinating. Negative for flank pain, frequency and urgency.   Musculoskeletal: Negative for arthralgias, back pain, gait problem and myalgias.   Skin: Negative for color change and pallor.   Allergic/Immunologic: Negative.    Neurological: Negative for dizziness, facial asymmetry, weakness, light-headedness and headaches.   Hematological: Negative for adenopathy. Does not bruise/bleed easily.   Psychiatric/Behavioral: The patient is nervous/anxious.      Objective:     Vital Signs (Most Recent):  Temp: 96.4 °F (35.8 °C) (05/11/20 0727)  Pulse: 79 (05/11/20 0911)  Resp: 18 (05/11/20 0911)  BP: (!) 115/50 (05/11/20 0727)  SpO2: 99 % (05/11/20 0911) Vital Signs (24h Range):  Temp:  [96.4 °F (35.8 °C)-98.2 °F (36.8 °C)] 96.4 °F (35.8 °C)  Pulse:  [73-85] 79  Resp:  [18] 18  SpO2:  [95 %-99 %] 99 %  BP: (115-195)/() 115/50     Weight: 114.6 kg (252 lb 10.4 oz)  Body mass index is 36.25 kg/m².    Intake/Output Summary (Last 24 hours) at 5/11/2020 1019  Last data filed at 5/10/2020 1700  Gross per 24 hour   Intake 850 ml   Output --   Net 850 ml      Physical Exam   Constitutional: He is oriented to person, place, and time. He appears well-developed and well-nourished.   HENT:   Head: Normocephalic and  atraumatic.   Eyes: Pupils are equal, round, and reactive to light. EOM are normal.   Neck: Normal range of motion. Neck supple. No tracheal deviation present. No thyromegaly present.   Cardiovascular: Normal rate, regular rhythm, normal heart sounds and intact distal pulses.   Pulmonary/Chest: Effort normal and breath sounds normal.   Abdominal: Soft. Bowel sounds are normal. He exhibits no distension. There is no tenderness. There is no rebound and no guarding.   Musculoskeletal: Normal range of motion.   Lymphadenopathy:     He has no cervical adenopathy.   Neurological: He is alert and oriented to person, place, and time.   Skin: Skin is warm and dry. Capillary refill takes less than 2 seconds.   Psychiatric: Judgment normal.   Nursing note and vitals reviewed.      Significant Labs: All pertinent labs within the past 24 hours have been reviewed.    Significant Imaging: I have reviewed and interpreted all pertinent imaging results/findings within the past 24 hours.

## 2020-05-11 NOTE — PT/OT/SLP PROGRESS
Patient attempted x 3 trials. Initially, RN, Shane, requested holding off as he has been very aggressive and had gone to sleep. Upon second attempt, RN, went in with therapist, he was unable to be aroused. He kept his eyes closed and would grumble here and there. OT checked with RN a third time, she reports he states he wants to be left alone. OT will follow up tomorrow.     Megha Escalante, OT

## 2020-05-11 NOTE — PLAN OF CARE
05/11/20 1556   Discharge Reassessment   Assessment Type Discharge Planning Reassessment   Anticipated Discharge Disposition SNF   Do you have any problems affording any of your prescribed medications? No   Discharge Plan A Skilled Nursing Facility   DME Needed Upon Discharge  none   Patient choice form signed by patient/caregiver List from [facility]   Post-Acute Status   Post-Acute Authorization Placement   Post-Acute Placement Status Referrals Sent     Informed by [facility] they are unable to accept new admissions until work is completed in two of their quarantine rooms. RAMIRO proceeded with referrals to [facility] and [facility]. [facility] denied admission stating they could not provide services this pt requires. Referral process is ongoing with [facility]. SW will follow and assist with placement.

## 2020-05-11 NOTE — ASSESSMENT & PLAN NOTE
Lab Results   Component Value Date    WBC 8.53 05/11/2020       No results found for this or any previous visit.  Results for orders placed or performed during the hospital encounter of 05/07/20   Urinalysis Microscopic   Result Value Ref Range    RBC, UA 15 (H) 0 - 4 /hpf    WBC, UA >100 (H) 0 - 5 /hpf    Bacteria Moderate (A) None-Occ /hpf    Hyaline Casts, UA 0 0-1/lpf /lpf    Microscopic Comment SEE COMMENT      Lactic acid 1.0  Urine and blood cultures pending  Cont IV Rocephin   hemodynamically stable    05/08/2020   Continue current antibiotics.  Change Rocephin to 1 g q.12 hours  Placement for patient on discharge.  Discussed with family further therapy options.    5/9/2020  Continue IV antibiotics  Awaiting placement on discharge  WBC wnl    5/10/2020  Patient stable, WBC wnl  Continue current therapy    05/11/2020  Patient is stable awaiting placement.  Cystitis is resolving  Repeat labs in the a.m. and patient is not accepted today

## 2020-05-11 NOTE — PROGRESS NOTES
[facility] Hospital Medicine  Progress Note    Patient Name: Waldo Cedeno  MRN: [id number]  Patient Class: IP- Inpatient   Admission Date: 5/7/2020  Length of Stay: 4 days  Attending Physician: Mohamud Fregoso MD  Primary Care Provider: Primary Doctor No        Subjective:     Principal Problem:Acute cystitis        HPI:  History of Present Illness:  Patient is a 70 y.o. male who has no past medical history on file presented to ED after EMS was called to patients house for reported fall. Patient is currently confused and agitated. He is oriented to self only and angry about being in an unfamiliar environment. He is poor historian and unable to provide history. No family at bedside. Patient states he feels fine. Denies any pain. No evidence of trauma on patient. He lives alone and per reports he is not able to care for himself. Family unable to provide assistance. He was found to have UTI and will be admitted for IV abx with possible SNF vs NH placement after discharge.    Overview/Hospital Course:  05/08/2020:  Patient is a 70-year-old male that was admitted last evening with reportedly having fallen at home and is unable to care for himself.  Patient states he does not know why he is here he feels fine.  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  Vital signs showed blood pressure mildly elevated 163/67 pulse 73 respirations 18 temperature 99.5° the and O2 sat 93%.  Patient showed T-max of 101.1° overnight.  Labs show a white blood cell count of 14.3 hemoglobin 13.6 hematocrit 40 differential shows 77 granulocytes 11 lymphocytes sed rate greater than 90.  Chemistry shows sodium 135 potassium 3.2 chloride 101 bicarb 24 anion gap 10 BUN 16 creatinine 1.0 and GFR greater than 60  CT of the brain revealed cortical atrophy deep white matter changes mild-to-moderate hydrocephalus.  Chest x-ray revealed no acute chest  "disease.    5/9/2020  Patient stable. Denies any pain. Still with confusion but very talkative and alert. WBC has normalized. Labs are stable. Patient now awaiting placement. Will continue on IV antibiotics.    5/10/2020  Patient stable. No change in care plan.     05/11/2020:  Patient is continued to be stable labs were reviewed and unremarkable  White blood cell count normal hemoglobin 13.1 hematocrit 39  CMP completely normal  BP (!) 115/50 (BP Location: Left arm, Patient Position: Lying)   Pulse 79   Temp 96.4 °F (35.8 °C) (Axillary)   Resp 18   Ht 5' 10" (1.778 m)   Wt 114.6 kg (252 lb 10.4 oz)   SpO2 99%   BMI 36.25 kg/m²      Interval History:     Review of Systems   Constitutional: Negative for activity change, appetite change, fatigue and fever.   HENT: Negative for congestion, ear discharge, mouth sores, nosebleeds, rhinorrhea, sinus pressure, sinus pain and tinnitus.    Eyes: Negative.  Negative for pain, redness and itching.   Respiratory: Positive for shortness of breath. Negative for apnea, cough, choking, chest tightness, wheezing and stridor.    Cardiovascular: Negative for chest pain, palpitations and leg swelling.   Gastrointestinal: Negative for abdominal distention, abdominal pain, anal bleeding, blood in stool, constipation and diarrhea.   Endocrine: Negative.    Genitourinary: Positive for decreased urine volume and difficulty urinating. Negative for flank pain, frequency and urgency.   Musculoskeletal: Negative for arthralgias, back pain, gait problem and myalgias.   Skin: Negative for color change and pallor.   Allergic/Immunologic: Negative.    Neurological: Negative for dizziness, facial asymmetry, weakness, light-headedness and headaches.   Hematological: Negative for adenopathy. Does not bruise/bleed easily.   Psychiatric/Behavioral: The patient is nervous/anxious.      Objective:     Vital Signs (Most Recent):  Temp: 96.4 °F (35.8 °C) (05/11/20 0727)  Pulse: 79 (05/11/20 " 0911)  Resp: 18 (05/11/20 0911)  BP: (!) 115/50 (05/11/20 0727)  SpO2: 99 % (05/11/20 0911) Vital Signs (24h Range):  Temp:  [96.4 °F (35.8 °C)-98.2 °F (36.8 °C)] 96.4 °F (35.8 °C)  Pulse:  [73-85] 79  Resp:  [18] 18  SpO2:  [95 %-99 %] 99 %  BP: (115-195)/() 115/50     Weight: 114.6 kg (252 lb 10.4 oz)  Body mass index is 36.25 kg/m².    Intake/Output Summary (Last 24 hours) at 5/11/2020 1019  Last data filed at 5/10/2020 1700  Gross per 24 hour   Intake 850 ml   Output --   Net 850 ml      Physical Exam   Constitutional: He is oriented to person, place, and time. He appears well-developed and well-nourished.   HENT:   Head: Normocephalic and atraumatic.   Eyes: Pupils are equal, round, and reactive to light. EOM are normal.   Neck: Normal range of motion. Neck supple. No tracheal deviation present. No thyromegaly present.   Cardiovascular: Normal rate, regular rhythm, normal heart sounds and intact distal pulses.   Pulmonary/Chest: Effort normal and breath sounds normal.   Abdominal: Soft. Bowel sounds are normal. He exhibits no distension. There is no tenderness. There is no rebound and no guarding.   Musculoskeletal: Normal range of motion.   Lymphadenopathy:     He has no cervical adenopathy.   Neurological: He is alert and oriented to person, place, and time.   Skin: Skin is warm and dry. Capillary refill takes less than 2 seconds.   Psychiatric: Judgment normal.   Nursing note and vitals reviewed.      Significant Labs: All pertinent labs within the past 24 hours have been reviewed.    Significant Imaging: I have reviewed and interpreted all pertinent imaging results/findings within the past 24 hours.      Assessment/Plan:      * Acute cystitis  Lab Results   Component Value Date    WBC 8.53 05/11/2020       No results found for this or any previous visit.  Results for orders placed or performed during the hospital encounter of 05/07/20   Urinalysis Microscopic   Result Value Ref Range    RBC, UA 15 (H)  0 - 4 /hpf    WBC, UA >100 (H) 0 - 5 /hpf    Bacteria Moderate (A) None-Occ /hpf    Hyaline Casts, UA 0 0-1/lpf /lpf    Microscopic Comment SEE COMMENT      Lactic acid 1.0  Urine and blood cultures pending  Cont IV Rocephin   hemodynamically stable    05/08/2020   Continue current antibiotics.  Change Rocephin to 1 g q.12 hours  Placement for patient on discharge.  Discussed with family further therapy options.    5/9/2020  Continue IV antibiotics  Awaiting placement on discharge  WBC wnl    5/10/2020  Patient stable, WBC wnl  Continue current therapy    05/11/2020  Patient is stable awaiting placement.  Cystitis is resolving  Repeat labs in the a.m. and patient is not accepted today    Alcohol abuse  Heavy daily alcohol use per family  Will start on valium taper  PRN ativan     05/11/2020:  Continue anxiolytics of transient 50 mg b.i.d.  Follow-up labs as needed.  Await for transfer to long term care facility hopefully today patient is ready for discharge    Fall  Xray pending  Fall precautions    Septic encephalopathy  Secondary to above  Patient with worsening dementia   Baseline unclear. Will need to discuss with family.   CT head: pending  Cont neuro checks  Tele sitter  Fall precautions  Aspiration precautions  Delirium precautions:  Avoid antihistamines, anticholinergics, hypnotics, and minimize opiates while controlling for pain as these medications may exacerbate delirium. Cues for day/night will assist with keeping patient calm and oriented - during daytime, please keep adequate light in room (open windows, lights on) and please keep room dim at night-time to encourage normal sleep-wake cycles    5/9/2020  Continue above measures for delirium/confusion  Fall precautions  Continue IV antibiotics  Labs have normalized    5/10/2020  Patient with dementia, delirium  Stable but no change in mental status from prior exams    05/11/2020:  Dementia and delirium are continuing however mildly improved.  We await  transfer for to long-term care facility.  Patient is not able to live alone.      VTE Risk Mitigation (From admission, onward)         Ordered     enoxaparin injection 40 mg  Daily      05/07/20 2214     IP VTE HIGH RISK PATIENT  Once      05/07/20 2214     Place sequential compression device  Until discontinued      05/07/20 2214                      Mohamud Fregoso MD  Department of Hospital Medicine   [facility]

## 2020-05-11 NOTE — ASSESSMENT & PLAN NOTE
Secondary to above  Patient with worsening dementia   Baseline unclear. Will need to discuss with family.   CT head: pending  Cont neuro checks  Tele sitter  Fall precautions  Aspiration precautions  Delirium precautions:  Avoid antihistamines, anticholinergics, hypnotics, and minimize opiates while controlling for pain as these medications may exacerbate delirium. Cues for day/night will assist with keeping patient calm and oriented - during daytime, please keep adequate light in room (open windows, lights on) and please keep room dim at night-time to encourage normal sleep-wake cycles    5/9/2020  Continue above measures for delirium/confusion  Fall precautions  Continue IV antibiotics  Labs have normalized    5/10/2020  Patient with dementia, delirium  Stable but no change in mental status from prior exams    05/11/2020:  Dementia and delirium are continuing however mildly improved.  We await transfer for to long-term care facility.  Patient is not able to live alone.

## 2020-05-12 LAB
ALBUMIN SERPL BCP-MCNC: 3.3 G/DL (ref 3.5–5.2)
ALP SERPL-CCNC: 83 U/L (ref 55–135)
ALT SERPL W/O P-5'-P-CCNC: 49 U/L (ref 10–44)
ANION GAP SERPL CALC-SCNC: 8 MMOL/L (ref 8–16)
AST SERPL-CCNC: 40 U/L (ref 10–40)
BACTERIA BLD CULT: NORMAL
BASOPHILS # BLD AUTO: 0.04 K/UL (ref 0–0.2)
BASOPHILS NFR BLD: 0.4 % (ref 0–1.9)
BILIRUB SERPL-MCNC: 0.7 MG/DL (ref 0.1–1)
BUN SERPL-MCNC: 16 MG/DL (ref 8–23)
CALCIUM SERPL-MCNC: 8.7 MG/DL (ref 8.7–10.5)
CHLORIDE SERPL-SCNC: 103 MMOL/L (ref 95–110)
CO2 SERPL-SCNC: 25 MMOL/L (ref 23–29)
CREAT SERPL-MCNC: 0.7 MG/DL (ref 0.5–1.4)
DIFFERENTIAL METHOD: ABNORMAL
EOSINOPHIL # BLD AUTO: 0.6 K/UL (ref 0–0.5)
EOSINOPHIL NFR BLD: 6.1 % (ref 0–8)
ERYTHROCYTE [DISTWIDTH] IN BLOOD BY AUTOMATED COUNT: 13.1 % (ref 11.5–14.5)
EST. GFR  (AFRICAN AMERICAN): >60 ML/MIN/1.73 M^2
EST. GFR  (NON AFRICAN AMERICAN): >60 ML/MIN/1.73 M^2
GLUCOSE SERPL-MCNC: 98 MG/DL (ref 70–110)
HCT VFR BLD AUTO: 40.2 % (ref 40–54)
HGB BLD-MCNC: 13.7 G/DL (ref 14–18)
IMM GRANULOCYTES # BLD AUTO: 0.04 K/UL (ref 0–0.04)
IMM GRANULOCYTES NFR BLD AUTO: 0.4 % (ref 0–0.5)
LYMPHOCYTES # BLD AUTO: 1.5 K/UL (ref 1–4.8)
LYMPHOCYTES NFR BLD: 14.8 % (ref 18–48)
MAGNESIUM SERPL-MCNC: 2.2 MG/DL (ref 1.6–2.6)
MCH RBC QN AUTO: 30 PG (ref 27–31)
MCHC RBC AUTO-ENTMCNC: 34.1 G/DL (ref 32–36)
MCV RBC AUTO: 88 FL (ref 82–98)
MONOCYTES # BLD AUTO: 0.8 K/UL (ref 0.3–1)
MONOCYTES NFR BLD: 7.9 % (ref 4–15)
NEUTROPHILS # BLD AUTO: 7.1 K/UL (ref 1.8–7.7)
NEUTROPHILS NFR BLD: 70.4 % (ref 38–73)
NRBC BLD-RTO: 0 /100 WBC
PHOSPHATE SERPL-MCNC: 3.3 MG/DL (ref 2.7–4.5)
PLATELET # BLD AUTO: 320 K/UL (ref 150–350)
PMV BLD AUTO: 10.4 FL (ref 9.2–12.9)
POTASSIUM SERPL-SCNC: 4 MMOL/L (ref 3.5–5.1)
PROT SERPL-MCNC: 7.9 G/DL (ref 6–8.4)
RBC # BLD AUTO: 4.57 M/UL (ref 4.6–6.2)
SODIUM SERPL-SCNC: 136 MMOL/L (ref 136–145)
WBC # BLD AUTO: 10.05 K/UL (ref 3.9–12.7)

## 2020-05-12 PROCEDURE — 25000003 PHARM REV CODE 250: Performed by: FAMILY MEDICINE

## 2020-05-12 PROCEDURE — 83735 ASSAY OF MAGNESIUM: CPT

## 2020-05-12 PROCEDURE — 80053 COMPREHEN METABOLIC PANEL: CPT

## 2020-05-12 PROCEDURE — 11000001 HC ACUTE MED/SURG PRIVATE ROOM

## 2020-05-12 PROCEDURE — 25000003 PHARM REV CODE 250: Performed by: INTERNAL MEDICINE

## 2020-05-12 PROCEDURE — 84100 ASSAY OF PHOSPHORUS: CPT

## 2020-05-12 PROCEDURE — 25000003 PHARM REV CODE 250: Performed by: HOSPITALIST

## 2020-05-12 PROCEDURE — 99232 SBSQ HOSP IP/OBS MODERATE 35: CPT | Mod: ,,, | Performed by: INTERNAL MEDICINE

## 2020-05-12 PROCEDURE — 85025 COMPLETE CBC W/AUTO DIFF WBC: CPT

## 2020-05-12 PROCEDURE — 94761 N-INVAS EAR/PLS OXIMETRY MLT: CPT

## 2020-05-12 PROCEDURE — 63600175 PHARM REV CODE 636 W HCPCS: Performed by: HOSPITALIST

## 2020-05-12 PROCEDURE — 99900035 HC TECH TIME PER 15 MIN (STAT)

## 2020-05-12 PROCEDURE — 36415 COLL VENOUS BLD VENIPUNCTURE: CPT

## 2020-05-12 PROCEDURE — 63600175 PHARM REV CODE 636 W HCPCS: Performed by: INTERNAL MEDICINE

## 2020-05-12 PROCEDURE — 25000242 PHARM REV CODE 250 ALT 637 W/ HCPCS: Performed by: FAMILY MEDICINE

## 2020-05-12 PROCEDURE — 99232 PR SUBSEQUENT HOSPITAL CARE,LEVL II: ICD-10-PCS | Mod: ,,, | Performed by: INTERNAL MEDICINE

## 2020-05-12 PROCEDURE — 94640 AIRWAY INHALATION TREATMENT: CPT

## 2020-05-12 RX ADMIN — POTASSIUM CHLORIDE 20 MEQ: 1500 TABLET, EXTENDED RELEASE ORAL at 08:05

## 2020-05-12 RX ADMIN — CEFTRIAXONE SODIUM 1 G: 1 INJECTION, POWDER, FOR SOLUTION INTRAMUSCULAR; INTRAVENOUS at 12:05

## 2020-05-12 RX ADMIN — AMLODIPINE BESYLATE 5 MG: 2.5 TABLET ORAL at 11:05

## 2020-05-12 RX ADMIN — VANCOMYCIN HYDROCHLORIDE 1500 MG: 1.5 INJECTION, POWDER, LYOPHILIZED, FOR SOLUTION INTRAVENOUS at 09:05

## 2020-05-12 RX ADMIN — CEFTRIAXONE SODIUM 1 G: 1 INJECTION, POWDER, FOR SOLUTION INTRAMUSCULAR; INTRAVENOUS at 11:05

## 2020-05-12 RX ADMIN — ENOXAPARIN SODIUM 40 MG: 100 INJECTION SUBCUTANEOUS at 05:05

## 2020-05-12 RX ADMIN — CLORAZEPATE DIPOTASSIUM 7.5 MG: 7.5 TABLET ORAL at 05:05

## 2020-05-12 RX ADMIN — IPRATROPIUM BROMIDE AND ALBUTEROL SULFATE 3 ML: .5; 3 SOLUTION RESPIRATORY (INHALATION) at 01:05

## 2020-05-12 RX ADMIN — SENNOSIDES AND DOCUSATE SODIUM 1 TABLET: 8.6; 5 TABLET ORAL at 08:05

## 2020-05-12 RX ADMIN — POTASSIUM CHLORIDE 20 MEQ: 1500 TABLET, EXTENDED RELEASE ORAL at 11:05

## 2020-05-12 RX ADMIN — CLORAZEPATE DIPOTASSIUM 7.5 MG: 7.5 TABLET ORAL at 11:05

## 2020-05-12 RX ADMIN — VANCOMYCIN HYDROCHLORIDE 1500 MG: 1.5 INJECTION, POWDER, LYOPHILIZED, FOR SOLUTION INTRAVENOUS at 12:05

## 2020-05-12 NOTE — PLAN OF CARE
05/12/20 1307   Discharge Reassessment   Assessment Type Discharge Planning Reassessment     [facility] has declined admission for geriatric physch treatment stating they do not have the staffing at this time for pts maximum assistance that is required. SW will continue to seek placement for this pt.

## 2020-05-12 NOTE — ASSESSMENT & PLAN NOTE
Secondary to above  Patient with worsening dementia   Baseline unclear. Will need to discuss with family.   CT head: pending  Cont neuro checks  Tele sitter  Fall precautions  Aspiration precautions  Delirium precautions:  Avoid antihistamines, anticholinergics, hypnotics, and minimize opiates while controlling for pain as these medications may exacerbate delirium. Cues for day/night will assist with keeping patient calm and oriented - during daytime, please keep adequate light in room (open windows, lights on) and please keep room dim at night-time to encourage normal sleep-wake cycles    5/9/2020  Continue above measures for delirium/confusion  Fall precautions  Continue IV antibiotics  Labs have normalized    5/10/2020  Patient with dementia, delirium  Stable but no change in mental status from prior exams    05/11/2020:  Dementia and delirium are continuing however mildly improved.  We await transfer for to long-term care facility.  Patient is not able to live alone.    05/12/2020  Dementia is stable  Awaiting transfer to long-term care facility.

## 2020-05-12 NOTE — ASSESSMENT & PLAN NOTE
Lab Results   Component Value Date    WBC 10.05 05/12/2020       No results found for this or any previous visit.  Results for orders placed or performed during the hospital encounter of 05/07/20   Urinalysis Microscopic   Result Value Ref Range    RBC, UA 15 (H) 0 - 4 /hpf    WBC, UA >100 (H) 0 - 5 /hpf    Bacteria Moderate (A) None-Occ /hpf    Hyaline Casts, UA 0 0-1/lpf /lpf    Microscopic Comment SEE COMMENT      Lactic acid 1.0  Urine and blood cultures pending  Cont IV Rocephin   hemodynamically stable    05/08/2020   Continue current antibiotics.  Change Rocephin to 1 g q.12 hours  Placement for patient on discharge.  Discussed with family further therapy options.    5/9/2020  Continue IV antibiotics  Awaiting placement on discharge  WBC wnl    5/10/2020  Patient stable, WBC wnl  Continue current therapy    05/11/2020  Patient is stable awaiting placement.  Cystitis is resolving  Repeat labs in the a.m. and patient is not accepted today    05/12/2020  Patient is stable can continuing to await placement.  Infection is resolving patient is at his baseline mental status.

## 2020-05-12 NOTE — PHYSICIAN QUERY
PT Name: Waldo Cedeno  MR #: [id number]     Diagnosis Clarification      CDS: Brandy Capley, RN  Email: [email or web address]    This form is a permanent document in the medical record.     Query Date: May 12, 2020    Dear Provider,  By submitting this query, we are merely seeking further clarification of documentation.  Please utilize your independent clinical judgment when addressing the question(s) below.     The medical record contains the following:    Supporting Clinical Information Location in Medical Record     Patient is a 70 y.o. male who has no past medical history on file presented to ED after EMS was called to [facility] for reported fall. Patient is currently confused and agitated. He is oriented to self only and angry about being in an unfamiliar environment. He is poor historian and unable to provide history.    Neurological: He is alert. He is disoriented.   Psychiatric: His speech is normal. His mood appears anxious. He is agitated. He is not actively hallucinating. Thought content is delusional. Cognition and memory are impaired. He expresses impulsivity    Alcohol abuse  Heavy daily alcohol use per family  Will start on valium taper  PRN ativan     Septic encephalopathy  Secondary to above  Patient with worsening dementia   Baseline unclear. Will need to discuss with family.     05/11/2020:  Dementia and delirium are continuing however mildly improved.   H&P 5/7                                    Hospital medicine progress notes 5/11     Please clarify if the ____septic encephalopathy____ diagnosis has been:    [x  ] Ruled In     [  ] Ruled Out     [  ] Other/Clarification of findings (please specify)_______________      [   ] Clinically undetermined           Please document in your progress notes daily for the duration of treatment, until resolved, and include in your discharge summary.

## 2020-05-12 NOTE — PLAN OF CARE
Problem: Fall Injury Risk  Goal: Absence of Fall and Fall-Related Injury  5/12/2020 0536 by Rene Salcido RN  Outcome: Ongoing, Progressing  5/12/2020 0531 by Rene Salcido RN  Outcome: Ongoing, Progressing  Intervention: Identify and Manage Contributors to Fall Injury Risk  5/12/2020 0536 by Rene Salcido RN  Flowsheets (Taken 5/12/2020 0536)  Self-Care Promotion: BADL personal objects within reach  Medication Review/Management: high risk medications identified  5/12/2020 0531 by Rene Salcido RN  Flowsheets (Taken 5/12/2020 0531)  Self-Care Promotion: BADL personal objects within reach  Medication Review/Management: high risk medications identified  Intervention: Promote Injury-Free Environment  5/12/2020 0536 by Rene Salcido RN  Flowsheets (Taken 5/12/2020 0536)  Safety Promotion/Fall Prevention: side rails raised x 2; bed alarm set; Fall Risk signage in place; Fall Risk reviewed with patient/family; instructed to call staff for mobility  5/12/2020 0531 by Rene Salcido RN  Flowsheets (Taken 5/12/2020 0531)  Safety Promotion/Fall Prevention: bed alarm set;side rails raised x 2  Environmental Safety Modification: assistive device/personal items within reach;clutter free environment maintained;lighting adjusted   PT RESTING QUIETLY. NO C/O PAIN NO GRIMACE TO FACE NO MOANING NOTED.

## 2020-05-12 NOTE — PLAN OF CARE
Problem: Fall Injury Risk  Goal: Absence of Fall and Fall-Related Injury  Outcome: Ongoing, Progressing  Intervention: Identify and Manage Contributors to Fall Injury Risk  Flowsheets (Taken 5/12/2020 0531)  Self-Care Promotion: BADL personal objects within reach  Medication Review/Management: high risk medications identified  Intervention: Promote Injury-Free Environment  Flowsheets (Taken 5/12/2020 0531)  Safety Promotion/Fall Prevention: bed alarm set; side rails raised x 2  Environmental Safety Modification: assistive device/personal items within reach; clutter free environment maintained; lighting adjusted

## 2020-05-12 NOTE — PLAN OF CARE
05/12/20 1630   Discharge Reassessment   Assessment Type Discharge Planning Reassessment   Anticipated Discharge Disposition SNF     SW has requested [facility] forward pt referral to [facility] for consideration to their facility as representative informs they take more medically complex patients. Contact made with [facility] and they inform facility is not admitting at this time with COVID parameters. Referral sent to [facility] in [place], [place] who are accepting new residents at this time.    Currently awaiting response on evaluations with [facility] Christopher, [facility] and [facility]. SW will continue to follow and assist with placement.

## 2020-05-12 NOTE — PHYSICIAN QUERY
PT Name: Waldo Cedeno  MR #: [id number]    Physician Query Form -Systemic Infectious Process Clarification     CDS: Brandy Capley, RN  Email: [email or web address]    This form is a permanent document in the medical record.     Query Date: May 12, 2020     By submitting this query, we are merely seeking further clarification of documentation. Please utilize your independent clinical judgment when addressing the question(s) below.    The Medical record contains the following:     Indicators   Supporting Clinical Findings   Location in Medical Record   X HR RR BP Temp Vital Signs (24h Range):  Temp:  [101.4 °F (38.6 °C)] 101.4 °F (38.6 °C)  Pulse:  [88-90] 89  Resp:  [17-21] 19  SpO2:  [95 %-96 %] 96 %  BP: (156-186)/(79-98) 168/83    H&P 5/7   X Lactic Acid             Procalcitonin Lactate: 1   Labs 5/7   X WBC                Bands                     CRP WBC: 15.09 --> 14.32 --> 8.22    CRP: 14.69   Labs 5/7 --> 5/8 --> 5/9    Labs 5/7   X Culture(s) ESCHERICHIA COLI  >100,000 cfu/ml     No Growth after 4 days.     COAGULASE-NEGATIVE STAPHYLOCOCCUS SPECIES   Organism is a probable contaminant    Urine culture 5/7    Blood culture 5/7 2110    Blood culture 5/7 2124   X AMS, Confusion, LOC, etc. Neurological: He is alert. He is disoriented.   Psychiatric: His speech is normal. His mood appears anxious. He is agitated. He is not actively hallucinating. Thought content is delusional. Cognition and memory are impaired. He expresses impulsivity   H&P 5/7   X Organ Dysfunction / Failure Septic encephalopathy  Secondary to above  Patient with worsening dementia   Baseline unclear. Will need to discuss with family   H&P 5/7   X Bacteremia or Sepsis / Septic Based on HPI, physical exam with metabolic encephalopathy secondary to sepsis likely source is UTI patient be admitted for IV antibiotics with possible discharge to skilled nursing facility under hospitalist Medicine Dr. [place]oba   ED provider notes 5/7   X Known or  Suspected Source of Infection documented Acute cystitis   H&P 5/7    (Failed) Outpatient Treatment     X Medication Cont IV Rocephin    vancomycin (VANCOCIN) 1,750 mg in dextrose 5 % 500 mL IVPB   Hospital medicine progress notes 5/8    MAR 5/9    Treatment      Other       Provider, please specify diagnosis or diagnoses associated with above clinical findings.    [   ] Sepsis (please specify contributing organism/s):    ( x )  Escherichia Coli   (  )  Coag negative staph   (  )  Escherichia Coli and Coag negative staph   (  )  Other (please specify):  _______________________   (  )  Clinically undetermined     [   ] Severe Sepsis with Acute Organ Dysfunction/Failure (please specify  organ dysfunction/failure): _________(please specify contributing organism/s):    (  )  Escherichia Coli   (  )  Coag negative staph   (  )  Escherichia Coli and Coag negative staph   (  )  Other (please specify):  _______________________   (  )  Clinically undetermined     [   ] Other: __________________________________     [  ]  Clinically Undetermined         Please document in your progress notes daily for the duration of treatment until resolved and include in your discharge summary.

## 2020-05-12 NOTE — PROGRESS NOTES
Pharmacokinetic Assessment Follow Up: IV Vancomycin    Vancomycin serum concentration assessment(s):    20.3 mcg/ml    Vancomycin Regimen Plan:    Change dose to vancomycin 1.5 g IV Q12H    Drug levels (last 3 results):  Recent Labs   Lab Result Units 05/10/20  2009 05/11/20  1958   Vancomycin-Trough ug/mL 16.1 20.3       Pharmacy will continue to follow and monitor vancomycin.    Please contact pharmacy at extension [phone] for questions regarding this assessment.    Thank you for the consult,   Cary Moore       Patient brief summary:  Waldo Cedeno is a 70 y.o. male initiated on antimicrobial therapy with IV Vancomycin for treatment of Septic encephalopathy.     The patient's current regimen is vancomycin 1.75 g IV Q12H    Drug Allergies:   Review of patient's allergies indicates:  No Known Allergies    Actual Body Weight:   114.6 Kg    Renal Function:   Estimated Creatinine Clearance: 124.4 mL/min (based on SCr of 0.7 mg/dL).,     Dialysis Method (if applicable):  N/a    CBC (last 72 hours):  Recent Labs   Lab Result Units 05/11/20  0530 05/12/20  0725   WBC K/uL 8.53 10.05   Hemoglobin g/dL 13.1* 13.7*   Hematocrit % 39.1* 40.2   Platelets K/uL 306 320   Gran% % 63.6 70.4   Lymph% % 19.5 14.8*   Mono% % 9.8 7.9   Eosinophil% % 6.2 6.1   Basophil% % 0.4 0.4   Differential Method  Automated Automated       Metabolic Panel (last 72 hours):  Recent Labs   Lab Result Units 05/10/20  2009 05/11/20  0530   Sodium mmol/L 132* 137   Potassium mmol/L 3.8 3.7   Chloride mmol/L 100 104   CO2 mmol/L 24 25   Glucose mg/dL 108 100   BUN, Bld mg/dL 18 14   Creatinine mg/dL 0.8 0.7   Albumin g/dL 3.1* 3.2*   Total Bilirubin mg/dL 0.7 0.2   Alkaline Phosphatase U/L 81 76   AST U/L 39 33   ALT U/L 44 40   Magnesium mg/dL 2.0 1.9   Phosphorus mg/dL 3.5 3.1       Vancomycin Administrations:  vancomycin given in the last 96 hours                     VANCOMYCIN 500 MG/100 ML NS (READY TO MIX)  IVPB (mg) 500 mg New Bag 05/11/20  2244    vancomycin (VANCOCIN) 1,500 mg in dextrose 5 % 250 mL IVPB (mg) 1,500 mg New Bag 05/11/20 2243    vancomycin (VANCOCIN) 1,750 mg in dextrose 5 % 500 mL IVPB (mg) 1,750 mg New Bag 05/11/20 0912     1,750 mg New Bag 05/10/20 2148     1,750 mg New Bag  0813     1,750 mg New Bag 05/09/20 2030                      Microbiologic Results:  Microbiology Results (last 7 days)       Procedure Component Value Units Date/Time    Blood culture [[id number]] Collected:  05/07/20 2110    Order Status:  Completed Specimen:  Blood from Peripheral, Forearm, Left Updated:  05/11/20 1212     Blood Culture, Routine No Growth to date      No Growth to date      No Growth to date      No Growth to date    Narrative:       Specimen # 1    Blood culture [[id number]]  (Abnormal) Collected:  05/07/20 2124    Order Status:  Completed Specimen:  Blood from Peripheral, Forearm, Left Updated:  05/11/20 1031     Blood Culture, Routine Gram stain peds bottle: Gram positive cocci in clusters resembling Staph       Results called to and read back by: Jose Stevenson RN 05/09/2020        03:02      COAGULASE-NEGATIVE STAPHYLOCOCCUS SPECIES  Organism is a probable contaminant      Narrative:       Specimen # 2    Urine culture [[id number]]  (Abnormal)  (Susceptibility) Collected:  05/07/20 1931    Order Status:  Completed Specimen:  Urine Updated:  05/10/20 1205     Urine Culture, Routine ESCHERICHIA COLI  >100,000 cfu/ml      Narrative:       Preferred Collection Type->Urine, Catheterized

## 2020-05-12 NOTE — PLAN OF CARE
05/12/20 1159   Discharge Reassessment   Assessment Type Discharge Planning Reassessment   Anticipated Discharge Disposition Psych   Patient choice form signed by patient/caregiver List from System Post-Acute Care   Post-Acute Status   Post-Acute Authorization Placement   Post-Acute Placement Status Referrals Sent     SW followed up this day with nursing home referrals. [facility] is evaluating for admission, but with concerns about behaviors and long term care placement once skilled nursing care ends. Pt currently has [place] Medicaid and brother has limited access to the pts personal information which creates additional challenges.    Initiated referrals to geriatric psychiatric hospitals. Thus far, [facility] in [place] is unable to accept secondary to clinical needs. [facility] does not have bed availability at this time. [facility] in [place], [place] does not have adequate staffing to meet pts max assistance needs.     Referral to [facility] in [place], [place] is currently pending.     SW contacted the pts brother (Murphy Thomas [phone]) to discuss above referrals and options for placement. Brother is open for placement at the first available facility. He did express desire for long term placement in [place] as the pt is already on [place] Medicaid and this will be less complicated to get converted to long term care Medicaid in a nursing home. SW will continue to follow and assist as needed for discharge plan.

## 2020-05-12 NOTE — SUBJECTIVE & OBJECTIVE
Interval History:     Review of Systems   Constitutional: Negative for activity change, appetite change, fatigue and fever.   HENT: Negative for congestion, ear discharge, mouth sores, nosebleeds, rhinorrhea, sinus pressure, sinus pain and tinnitus.    Eyes: Negative.  Negative for pain, redness and itching.   Respiratory: Negative for apnea, cough, choking, chest tightness, shortness of breath, wheezing and stridor.    Cardiovascular: Negative for chest pain, palpitations and leg swelling.   Gastrointestinal: Negative for abdominal distention, abdominal pain, anal bleeding, blood in stool, constipation and diarrhea.   Endocrine: Negative.    Genitourinary: Positive for difficulty urinating and frequency. Negative for flank pain and urgency.   Musculoskeletal: Negative for arthralgias, back pain, gait problem and myalgias.   Skin: Negative for color change and pallor.   Allergic/Immunologic: Negative.    Neurological: Negative for dizziness, facial asymmetry, weakness, light-headedness and headaches.   Hematological: Negative for adenopathy. Does not bruise/bleed easily.   Psychiatric/Behavioral: The patient is nervous/anxious.      Objective:     Vital Signs (Most Recent):  Temp: 97 °F (36.1 °C) (05/12/20 1409)  Pulse: 78 (05/12/20 1409)  Resp: 17 (05/12/20 1409)  BP: (!) 142/89 (05/12/20 1409)  SpO2: 96 % (05/12/20 1409) Vital Signs (24h Range):  Temp:  [96.7 °F (35.9 °C)-98.1 °F (36.7 °C)] 97 °F (36.1 °C)  Pulse:  [70-79] 78  Resp:  [16-18] 17  SpO2:  [92 %-97 %] 96 %  BP: (142-183)/(65-89) 142/89     Weight: 114.6 kg (252 lb 10.4 oz)  Body mass index is 36.25 kg/m².    Intake/Output Summary (Last 24 hours) at 5/12/2020 1829  Last data filed at 5/12/2020 1400  Gross per 24 hour   Intake 360 ml   Output 600 ml   Net -240 ml      Physical Exam   Constitutional: He is oriented to person, place, and time. He appears well-developed and well-nourished.   HENT:   Head: Normocephalic and atraumatic.   Eyes: Pupils are  equal, round, and reactive to light. EOM are normal.   Neck: Normal range of motion. Neck supple. No tracheal deviation present. No thyromegaly present.   Cardiovascular: Normal rate, regular rhythm, normal heart sounds and intact distal pulses.   Pulmonary/Chest: Effort normal. He has rales.   Abdominal: Soft. Bowel sounds are normal. He exhibits no distension. There is no tenderness. There is no rebound and no guarding.   Musculoskeletal: Normal range of motion.   Lymphadenopathy:     He has no cervical adenopathy.   Neurological: He is alert and oriented to person, place, and time.   Skin: Skin is warm and dry. Capillary refill takes less than 2 seconds.   Nursing note and vitals reviewed.      Significant Labs:   Recent Lab Results       05/12/20  0725   05/11/20 1958        Albumin 3.3       Alkaline Phosphatase 83       ALT 49       Anion Gap 8       AST 40       Baso # 0.04       Basophil% 0.4       BILIRUBIN TOTAL 0.7  Comment:  For infants and newborns, interpretation of results should be based  on gestational age, weight and in agreement with clinical  observations.  Premature Infant recommended reference ranges:  Up to 24 hours.............<8.0 mg/dL  Up to 48 hours............<12.0 mg/dL  3-5 days..................<15.0 mg/dL  6-29 days.................<15.0 mg/dL         BUN, Bld 16       Calcium 8.7       Chloride 103       CO2 25       Creatinine 0.7       Differential Method Automated       eGFR if  >60.0       eGFR if non  >60.0  Comment:  Calculation used to obtain the estimated glomerular filtration  rate (eGFR) is the CKD-EPI equation.          Eos # 0.6       Eosinophil% 6.1       Glucose 98       Gran # (ANC) 7.1       Gran% 70.4       Hematocrit 40.2       Hemoglobin 13.7       Immature Grans (Abs) 0.04  Comment:  Mild elevation in immature granulocytes is non specific and   can be seen in a variety of conditions including stress response,   acute inflammation,  trauma and pregnancy. Correlation with other   laboratory and clinical findings is essential.         Immature Granulocytes 0.4       Lymph # 1.5       Lymph% 14.8       Magnesium 2.2       MCH 30.0       MCHC 34.1       MCV 88       Mono # 0.8       Mono% 7.9       MPV 10.4       nRBC 0       Phosphorus 3.3       Platelets 320       Potassium 4.0       PROTEIN TOTAL 7.9       RBC 4.57       RDW 13.1       Sodium 136       Vancomycin-Trough   20.3     WBC 10.05           All pertinent labs within the past 24 hours have been reviewed.    Significant Imaging: I have reviewed and interpreted all pertinent imaging results/findings within the past 24 hours.

## 2020-05-12 NOTE — PROGRESS NOTES
[facility] Hospital Medicine  Progress Note    Patient Name: Waldo Cedeno  MRN: [id number]  Patient Class: IP- Inpatient   Admission Date: 5/7/2020  Length of Stay: 5 days  Attending Physician: Mohamud Fregoso MD  Primary Care Provider: Primary Doctor No        Subjective:     Principal Problem:Acute cystitis        HPI:  History of Present Illness:  Patient is a 70 y.o. male who has no past medical history on file presented to ED after EMS was called to patients house for reported fall. Patient is currently confused and agitated. He is oriented to self only and angry about being in an unfamiliar environment. He is poor historian and unable to provide history. No family at bedside. Patient states he feels fine. Denies any pain. No evidence of trauma on patient. He lives alone and per reports he is not able to care for himself. Family unable to provide assistance. He was found to have UTI and will be admitted for IV abx with possible SNF vs NH placement after discharge.    Overview/Hospital Course:  05/08/2020:  Patient is a 70-year-old male that was admitted last evening with reportedly having fallen at home and is unable to care for himself.  Patient states he does not know why he is here he feels fine.  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  Vital signs showed blood pressure mildly elevated 163/67 pulse 73 respirations 18 temperature 99.5° the and O2 sat 93%.  Patient showed T-max of 101.1° overnight.  Labs show a white blood cell count of 14.3 hemoglobin 13.6 hematocrit 40 differential shows 77 granulocytes 11 lymphocytes sed rate greater than 90.  Chemistry shows sodium 135 potassium 3.2 chloride 101 bicarb 24 anion gap 10 BUN 16 creatinine 1.0 and GFR greater than 60  CT of the brain revealed cortical atrophy deep white matter changes mild-to-moderate hydrocephalus.  Chest x-ray revealed no acute chest  "disease.    5/9/2020  Patient stable. Denies any pain. Still with confusion but very talkative and alert. WBC has normalized. Labs are stable. Patient now awaiting placement. Will continue on IV antibiotics.    5/10/2020  Patient stable. No change in care plan.     05/11/2020:  Patient is continued to be stable labs were reviewed and unremarkable  White blood cell count normal hemoglobin 13.1 hematocrit 39  CMP completely normal  BP (!) 115/50 (BP Location: Left arm, Patient Position: Lying)   Pulse 79   Temp 96.4 °F (35.8 °C) (Axillary)   Resp 18   Ht 5' 10" (1.778 m)   Wt 114.6 kg (252 lb 10.4 oz)   SpO2 99%   BMI 36.25 kg/m²       05/12/2020:  BP (!) 142/89 (BP Location: Right arm, Patient Position: Lying)   Pulse 78   Temp 97 °F (36.1 °C) (Axillary)   Resp 17   Ht 5' 10" (1.778 m)   Wt 114.6 kg (252 lb 10.4 oz)   SpO2 96%   BMI 36.25 kg/m²     Blood cultures negative to date.  Comprehensive metabolic panel normal except for albumin 3.3  CBC normal white blood cell count 10.0 hemoglobin 13.7 hematocrit 40 and differential was normal    Interval History:     Review of Systems   Constitutional: Negative for activity change, appetite change, fatigue and fever.   HENT: Negative for congestion, ear discharge, mouth sores, nosebleeds, rhinorrhea, sinus pressure, sinus pain and tinnitus.    Eyes: Negative.  Negative for pain, redness and itching.   Respiratory: Negative for apnea, cough, choking, chest tightness, shortness of breath, wheezing and stridor.    Cardiovascular: Negative for chest pain, palpitations and leg swelling.   Gastrointestinal: Negative for abdominal distention, abdominal pain, anal bleeding, blood in stool, constipation and diarrhea.   Endocrine: Negative.    Genitourinary: Positive for difficulty urinating and frequency. Negative for flank pain and urgency.   Musculoskeletal: Negative for arthralgias, back pain, gait problem and myalgias.   Skin: Negative for color change and pallor. "   Allergic/Immunologic: Negative.    Neurological: Negative for dizziness, facial asymmetry, weakness, light-headedness and headaches.   Hematological: Negative for adenopathy. Does not bruise/bleed easily.   Psychiatric/Behavioral: The patient is nervous/anxious.      Objective:     Vital Signs (Most Recent):  Temp: 97 °F (36.1 °C) (05/12/20 1409)  Pulse: 78 (05/12/20 1409)  Resp: 17 (05/12/20 1409)  BP: (!) 142/89 (05/12/20 1409)  SpO2: 96 % (05/12/20 1409) Vital Signs (24h Range):  Temp:  [96.7 °F (35.9 °C)-98.1 °F (36.7 °C)] 97 °F (36.1 °C)  Pulse:  [70-79] 78  Resp:  [16-18] 17  SpO2:  [92 %-97 %] 96 %  BP: (142-183)/(65-89) 142/89     Weight: 114.6 kg (252 lb 10.4 oz)  Body mass index is 36.25 kg/m².    Intake/Output Summary (Last 24 hours) at 5/12/2020 1829  Last data filed at 5/12/2020 1400  Gross per 24 hour   Intake 360 ml   Output 600 ml   Net -240 ml      Physical Exam   Constitutional: He is oriented to person, place, and time. He appears well-developed and well-nourished.   HENT:   Head: Normocephalic and atraumatic.   Eyes: Pupils are equal, round, and reactive to light. EOM are normal.   Neck: Normal range of motion. Neck supple. No tracheal deviation present. No thyromegaly present.   Cardiovascular: Normal rate, regular rhythm, normal heart sounds and intact distal pulses.   Pulmonary/Chest: Effort normal. He has rales.   Abdominal: Soft. Bowel sounds are normal. He exhibits no distension. There is no tenderness. There is no rebound and no guarding.   Musculoskeletal: Normal range of motion.   Lymphadenopathy:     He has no cervical adenopathy.   Neurological: He is alert and oriented to person, place, and time.   Skin: Skin is warm and dry. Capillary refill takes less than 2 seconds.   Nursing note and vitals reviewed.      Significant Labs:   Recent Lab Results       05/12/20  0725   05/11/20  1958        Albumin 3.3       Alkaline Phosphatase 83       ALT 49       Anion Gap 8       AST 40        Baso # 0.04       Basophil% 0.4       BILIRUBIN TOTAL 0.7  Comment:  For infants and newborns, interpretation of results should be based  on gestational age, weight and in agreement with clinical  observations.  Premature Infant recommended reference ranges:  Up to 24 hours.............<8.0 mg/dL  Up to 48 hours............<12.0 mg/dL  3-5 days..................<15.0 mg/dL  6-29 days.................<15.0 mg/dL         BUN, Bld 16       Calcium 8.7       Chloride 103       CO2 25       Creatinine 0.7       Differential Method Automated       eGFR if  >60.0       eGFR if non  >60.0  Comment:  Calculation used to obtain the estimated glomerular filtration  rate (eGFR) is the CKD-EPI equation.          Eos # 0.6       Eosinophil% 6.1       Glucose 98       Gran # (ANC) 7.1       Gran% 70.4       Hematocrit 40.2       Hemoglobin 13.7       Immature Grans (Abs) 0.04  Comment:  Mild elevation in immature granulocytes is non specific and   can be seen in a variety of conditions including stress response,   acute inflammation, trauma and pregnancy. Correlation with other   laboratory and clinical findings is essential.         Immature Granulocytes 0.4       Lymph # 1.5       Lymph% 14.8       Magnesium 2.2       MCH 30.0       MCHC 34.1       MCV 88       Mono # 0.8       Mono% 7.9       MPV 10.4       nRBC 0       Phosphorus 3.3       Platelets 320       Potassium 4.0       PROTEIN TOTAL 7.9       RBC 4.57       RDW 13.1       Sodium 136       Vancomycin-Trough   20.3     WBC 10.05           All pertinent labs within the past 24 hours have been reviewed.    Significant Imaging: I have reviewed and interpreted all pertinent imaging results/findings within the past 24 hours.      Assessment/Plan:      * Acute cystitis  Lab Results   Component Value Date    WBC 10.05 05/12/2020       No results found for this or any previous visit.  Results for orders placed or performed during the hospital  encounter of 05/07/20   Urinalysis Microscopic   Result Value Ref Range    RBC, UA 15 (H) 0 - 4 /hpf    WBC, UA >100 (H) 0 - 5 /hpf    Bacteria Moderate (A) None-Occ /hpf    Hyaline Casts, UA 0 0-1/lpf /lpf    Microscopic Comment SEE COMMENT      Lactic acid 1.0  Urine and blood cultures pending  Cont IV Rocephin   hemodynamically stable    05/08/2020   Continue current antibiotics.  Change Rocephin to 1 g q.12 hours  Placement for patient on discharge.  Discussed with family further therapy options.    5/9/2020  Continue IV antibiotics  Awaiting placement on discharge  WBC wnl    5/10/2020  Patient stable, WBC wnl  Continue current therapy    05/11/2020  Patient is stable awaiting placement.  Cystitis is resolving  Repeat labs in the a.m. and patient is not accepted today    05/12/2020  Patient is stable can continuing to await placement.  Infection is resolving patient is at his baseline mental status.    Alcohol abuse  Heavy daily alcohol use per family  Will start on valium taper  PRN ativan     05/11/2020:  Continue anxiolytics of transient 50 mg b.i.d.  Follow-up labs as needed.  Await for transfer to long term care facility hopefully today patient is ready for discharge    Fall  Xray pending  Fall precautions    Septic encephalopathy  Secondary to above  Patient with worsening dementia   Baseline unclear. Will need to discuss with family.   CT head: pending  Cont neuro checks  Tele sitter  Fall precautions  Aspiration precautions  Delirium precautions:  Avoid antihistamines, anticholinergics, hypnotics, and minimize opiates while controlling for pain as these medications may exacerbate delirium. Cues for day/night will assist with keeping patient calm and oriented - during daytime, please keep adequate light in room (open windows, lights on) and please keep room dim at night-time to encourage normal sleep-wake cycles    5/9/2020  Continue above measures for delirium/confusion  Fall precautions  Continue IV  antibiotics  Labs have normalized    5/10/2020  Patient with dementia, delirium  Stable but no change in mental status from prior exams    05/11/2020:  Dementia and delirium are continuing however mildly improved.  We await transfer for to long-term care facility.  Patient is not able to live alone.    05/12/2020  Dementia is stable  Awaiting transfer to long-term care facility.      VTE Risk Mitigation (From admission, onward)         Ordered     enoxaparin injection 40 mg  Daily      05/07/20 2214     IP VTE HIGH RISK PATIENT  Once      05/07/20 2214     Place sequential compression device  Until discontinued      05/07/20 2214                      Mohamud Fregoso MD  Department of Hospital Medicine   [facility]

## 2020-05-13 LAB — VANCOMYCIN TROUGH SERPL-MCNC: 19.9 UG/ML (ref 10–22)

## 2020-05-13 PROCEDURE — 97530 THERAPEUTIC ACTIVITIES: CPT

## 2020-05-13 PROCEDURE — 80202 ASSAY OF VANCOMYCIN: CPT

## 2020-05-13 PROCEDURE — 63600175 PHARM REV CODE 636 W HCPCS: Performed by: INTERNAL MEDICINE

## 2020-05-13 PROCEDURE — 36415 COLL VENOUS BLD VENIPUNCTURE: CPT

## 2020-05-13 PROCEDURE — 94761 N-INVAS EAR/PLS OXIMETRY MLT: CPT

## 2020-05-13 PROCEDURE — 99900035 HC TECH TIME PER 15 MIN (STAT)

## 2020-05-13 PROCEDURE — 25000242 PHARM REV CODE 250 ALT 637 W/ HCPCS: Performed by: FAMILY MEDICINE

## 2020-05-13 PROCEDURE — 63600175 PHARM REV CODE 636 W HCPCS: Performed by: HOSPITALIST

## 2020-05-13 PROCEDURE — 97166 OT EVAL MOD COMPLEX 45 MIN: CPT

## 2020-05-13 PROCEDURE — 94640 AIRWAY INHALATION TREATMENT: CPT

## 2020-05-13 PROCEDURE — 25000003 PHARM REV CODE 250: Performed by: FAMILY MEDICINE

## 2020-05-13 PROCEDURE — 25000003 PHARM REV CODE 250: Performed by: HOSPITALIST

## 2020-05-13 PROCEDURE — 25000003 PHARM REV CODE 250: Performed by: INTERNAL MEDICINE

## 2020-05-13 PROCEDURE — 11000001 HC ACUTE MED/SURG PRIVATE ROOM

## 2020-05-13 RX ORDER — QUETIAPINE FUMARATE 25 MG/1
25 TABLET, FILM COATED ORAL 2 TIMES DAILY
Status: DISCONTINUED | OUTPATIENT
Start: 2020-05-13 | End: 2020-05-15 | Stop reason: HOSPADM

## 2020-05-13 RX ADMIN — CLORAZEPATE DIPOTASSIUM 7.5 MG: 7.5 TABLET ORAL at 09:05

## 2020-05-13 RX ADMIN — CEFTRIAXONE SODIUM 1 G: 1 INJECTION, POWDER, FOR SOLUTION INTRAMUSCULAR; INTRAVENOUS at 12:05

## 2020-05-13 RX ADMIN — ENOXAPARIN SODIUM 40 MG: 100 INJECTION SUBCUTANEOUS at 05:05

## 2020-05-13 RX ADMIN — POTASSIUM CHLORIDE 20 MEQ: 1500 TABLET, EXTENDED RELEASE ORAL at 09:05

## 2020-05-13 RX ADMIN — CLORAZEPATE DIPOTASSIUM 7.5 MG: 7.5 TABLET ORAL at 03:05

## 2020-05-13 RX ADMIN — SENNOSIDES AND DOCUSATE SODIUM 1 TABLET: 8.6; 5 TABLET ORAL at 08:05

## 2020-05-13 RX ADMIN — POTASSIUM CHLORIDE 20 MEQ: 1500 TABLET, EXTENDED RELEASE ORAL at 08:05

## 2020-05-13 RX ADMIN — QUETIAPINE FUMARATE 25 MG: 25 TABLET ORAL at 05:05

## 2020-05-13 RX ADMIN — IPRATROPIUM BROMIDE AND ALBUTEROL SULFATE 3 ML: .5; 3 SOLUTION RESPIRATORY (INHALATION) at 07:05

## 2020-05-13 RX ADMIN — CLORAZEPATE DIPOTASSIUM 7.5 MG: 7.5 TABLET ORAL at 08:05

## 2020-05-13 RX ADMIN — AMLODIPINE BESYLATE 5 MG: 2.5 TABLET ORAL at 09:05

## 2020-05-13 NOTE — PROGRESS NOTES
[facility] Hospital Medicine  Progress Note    Patient Name: Waldo Cedeno  MRN: [id number]  Patient Class: IP- Inpatient   Admission Date: 5/7/2020  Length of Stay: 6 days  Attending Physician: Mohamud Fregoso MD  Primary Care Provider: Primary Doctor No        Subjective:     Principal Problem:Acute cystitis        HPI:  History of Present Illness:  Patient is a 70 y.o. male who has no past medical history on file presented to ED after EMS was called to patients house for reported fall. Patient is currently confused and agitated. He is oriented to self only and angry about being in an unfamiliar environment. He is poor historian and unable to provide history. No family at bedside. Patient states he feels fine. Denies any pain. No evidence of trauma on patient. He lives alone and per reports he is not able to care for himself. Family unable to provide assistance. He was found to have UTI and will be admitted for IV abx with possible SNF vs NH placement after discharge.    Overview/Hospital Course:  05/08/2020:  Patient is a 70-year-old male that was admitted last evening with reportedly having fallen at home and is unable to care for himself.  Patient states he does not know why he is here he feels fine.  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  Vital signs showed blood pressure mildly elevated 163/67 pulse 73 respirations 18 temperature 99.5° the and O2 sat 93%.  Patient showed T-max of 101.1° overnight.  Labs show a white blood cell count of 14.3 hemoglobin 13.6 hematocrit 40 differential shows 77 granulocytes 11 lymphocytes sed rate greater than 90.  Chemistry shows sodium 135 potassium 3.2 chloride 101 bicarb 24 anion gap 10 BUN 16 creatinine 1.0 and GFR greater than 60  CT of the brain revealed cortical atrophy deep white matter changes mild-to-moderate hydrocephalus.  Chest x-ray revealed no acute chest  "disease.    5/9/2020  Patient stable. Denies any pain. Still with confusion but very talkative and alert. WBC has normalized. Labs are stable. Patient now awaiting placement. Will continue on IV antibiotics.    5/10/2020  Patient stable. No change in care plan.     05/11/2020:  Patient is continued to be stable labs were reviewed and unremarkable  White blood cell count normal hemoglobin 13.1 hematocrit 39  CMP completely normal  BP (!) 115/50 (BP Location: Left arm, Patient Position: Lying)   Pulse 79   Temp 96.4 °F (35.8 °C) (Axillary)   Resp 18   Ht 5' 10" (1.778 m)   Wt 114.6 kg (252 lb 10.4 oz)   SpO2 99%   BMI 36.25 kg/m²       05/12/2020:  BP (!) 142/89 (BP Location: Right arm, Patient Position: Lying)   Pulse 78   Temp 97 °F (36.1 °C) (Axillary)   Resp 17   Ht 5' 10" (1.778 m)   Wt 114.6 kg (252 lb 10.4 oz)   SpO2 96%   BMI 36.25 kg/m²     Blood cultures negative to date.  Comprehensive metabolic panel normal except for albumin 3.3  CBC normal white blood cell count 10.0 hemoglobin 13.7 hematocrit 40 and differential was normal    05/13/2020:  BP (!) 142/74 (BP Location: Right arm, Patient Position: Lying)   Pulse 72   Temp 97.8 °F (36.6 °C) (Oral)   Resp 18   Ht 5' 10" (1.778 m)   Wt 114.6 kg (252 lb 10.4 oz)   SpO2 95%   BMI 36.25 kg/m²     Vancomycin trough is 19.9 phosphorus and magnesium are normal.  Comprehensive metabolic panel normal  Patient is awaiting placement for rehab./or long-term placement.  Patient would benefit greatly from psychiatric evaluation and treatment    Interval History:     Review of Systems   Constitutional: Positive for fatigue. Negative for activity change, appetite change and fever.   HENT: Negative for congestion, ear discharge, mouth sores, nosebleeds, rhinorrhea, sinus pressure, sinus pain and tinnitus.    Eyes: Negative.  Negative for pain, redness and itching.   Respiratory: Negative for apnea, cough, choking, chest tightness, shortness of breath, " wheezing and stridor.    Cardiovascular: Negative for chest pain, palpitations and leg swelling.   Gastrointestinal: Negative for abdominal distention, abdominal pain, anal bleeding, blood in stool, constipation and diarrhea.   Endocrine: Negative.    Genitourinary: Negative for difficulty urinating, flank pain, frequency and urgency.   Musculoskeletal: Positive for arthralgias and myalgias. Negative for back pain and gait problem.   Skin: Negative for color change and pallor.   Allergic/Immunologic: Negative.    Neurological: Positive for weakness. Negative for dizziness, facial asymmetry, light-headedness and headaches.   Hematological: Negative for adenopathy. Does not bruise/bleed easily.   Psychiatric/Behavioral: Positive for behavioral problems. The patient is nervous/anxious.      Objective:     Vital Signs (Most Recent):  Temp: 97.8 °F (36.6 °C) (05/13/20 1500)  Pulse: 72 (05/13/20 1500)  Resp: 18 (05/13/20 1500)  BP: (!) 142/74 (05/13/20 1500)  SpO2: 95 % (05/13/20 1500) Vital Signs (24h Range):  Temp:  [96.9 °F (36.1 °C)-98.1 °F (36.7 °C)] 97.8 °F (36.6 °C)  Pulse:  [72-81] 72  Resp:  [18-20] 18  SpO2:  [92 %-99 %] 95 %  BP: (142-207)/(67-95) 142/74     Weight: 114.6 kg (252 lb 10.4 oz)  Body mass index is 36.25 kg/m².    Intake/Output Summary (Last 24 hours) at 5/13/2020 1647  Last data filed at 5/13/2020 1251  Gross per 24 hour   Intake 1070 ml   Output 800 ml   Net 270 ml      Physical Exam   Constitutional: He appears well-developed and well-nourished.   HENT:   Head: Normocephalic and atraumatic.   Eyes: Pupils are equal, round, and reactive to light. EOM are normal.   Neck: Normal range of motion. Neck supple. No tracheal deviation present. No thyromegaly present.   Cardiovascular: Normal rate, regular rhythm and normal heart sounds.   Pulmonary/Chest: Effort normal and breath sounds normal.   Abdominal: Soft. Bowel sounds are normal. He exhibits no distension. There is no tenderness. There is no  rebound and no guarding.   Musculoskeletal: Normal range of motion.   Lymphadenopathy:     He has no cervical adenopathy.   Neurological: He is alert.   Skin: Skin is warm and dry. Capillary refill takes less than 2 seconds.   Nursing note and vitals reviewed.      Significant Labs:   Recent Lab Results       05/13/20  1044        Vancomycin-Trough 19.9         All pertinent labs within the past 24 hours have been reviewed.    Significant Imaging: I have reviewed and interpreted all pertinent imaging results/findings within the past 24 hours.      Assessment/Plan:      * Acute cystitis  Lab Results   Component Value Date    WBC 10.05 05/12/2020       No results found for this or any previous visit.  Results for orders placed or performed during the hospital encounter of 05/07/20   Urinalysis Microscopic   Result Value Ref Range    RBC, UA 15 (H) 0 - 4 /hpf    WBC, UA >100 (H) 0 - 5 /hpf    Bacteria Moderate (A) None-Occ /hpf    Hyaline Casts, UA 0 0-1/lpf /lpf    Microscopic Comment SEE COMMENT      Lactic acid 1.0  Urine and blood cultures pending  Cont IV Rocephin   hemodynamically stable    05/08/2020   Continue current antibiotics.  Change Rocephin to 1 g q.12 hours  Placement for patient on discharge.  Discussed with family further therapy options.    5/9/2020  Continue IV antibiotics  Awaiting placement on discharge  WBC wnl    5/10/2020  Patient stable, WBC wnl  Continue current therapy    05/11/2020  Patient is stable awaiting placement.  Cystitis is resolving  Repeat labs in the a.m. and patient is not accepted today    05/12/2020  Patient is stable can continuing to await placement.  Infection is resolving patient is at his baseline mental status.    05/13/2020:  Patient's cystitis is improving with antibiotic therapy.  Will continue antibiotics until placement  Patient awaiting placement  We will begin Seroquel 25 mg p.o. b.i.d. in place of diazepam.  Patient did have increasing behavioral issues on  diazepam    Alcohol abuse  Heavy daily alcohol use per family  Will start on valium taper  PRN ativan     05/11/2020:  Continue anxiolytics of transient 50 mg b.i.d.  Follow-up labs as needed.  Await for transfer to long term care facility hopefully today patient is ready for discharge    Fall  Xray pending  Fall precautions    Septic encephalopathy  Secondary to above  Patient with worsening dementia   Baseline unclear. Will need to discuss with family.   CT head: pending  Cont neuro checks  Tele sitter  Fall precautions  Aspiration precautions  Delirium precautions:  Avoid antihistamines, anticholinergics, hypnotics, and minimize opiates while controlling for pain as these medications may exacerbate delirium. Cues for day/night will assist with keeping patient calm and oriented - during daytime, please keep adequate light in room (open windows, lights on) and please keep room dim at night-time to encourage normal sleep-wake cycles    5/9/2020  Continue above measures for delirium/confusion  Fall precautions  Continue IV antibiotics  Labs have normalized    5/10/2020  Patient with dementia, delirium  Stable but no change in mental status from prior exams    05/11/2020:  Dementia and delirium are continuing however mildly improved.  We await transfer for to long-term care facility.  Patient is not able to live alone.    05/12/2020  Dementia is stable  Awaiting transfer to long-term care facility.      VTE Risk Mitigation (From admission, onward)         Ordered     enoxaparin injection 40 mg  Daily      05/07/20 2214     IP VTE HIGH RISK PATIENT  Once      05/07/20 2214     Place sequential compression device  Until discontinued      05/07/20 2214                      Mohamud Fregoso MD  Department of Hospital Medicine   [facility]

## 2020-05-13 NOTE — PLAN OF CARE
Problem: Fall Injury Risk  Goal: Absence of Fall and Fall-Related Injury  Outcome: Ongoing, Progressing     Problem: Adult Inpatient Plan of Care  Goal: Plan of Care Review  Outcome: Ongoing, Progressing     Problem: Adult Inpatient Plan of Care  Goal: Patient-Specific Goal (Individualization)  Outcome: Ongoing, Progressing     Problem: Adult Inpatient Plan of Care  Goal: Absence of Hospital-Acquired Illness or Injury  Outcome: Ongoing, Progressing     Problem: Adult Inpatient Plan of Care  Goal: Optimal Comfort and Wellbeing  Outcome: Ongoing, Progressing     Problem: Adult Inpatient Plan of Care  Goal: Readiness for Transition of Care  Outcome: Ongoing, Progressing     Problem: Adult Inpatient Plan of Care  Goal: Rounds/Family Conference  Outcome: Ongoing, Progressing     Problem: Infection  Goal: Infection Symptom Resolution  Outcome: Ongoing, Progressing     Problem: Skin Injury Risk Increased  Goal: Skin Health and Integrity  Outcome: Ongoing, Progressing     Problem: UTI (Urinary Tract Infection)  Goal: Improved Infection Symptoms  Outcome: Ongoing, Progressing

## 2020-05-13 NOTE — PT/OT/SLP EVAL
"Occupational Therapy   Evaluation    Name: Waldo Cedeno  MRN: [id number]  Admitting Diagnosis:  Acute cystitis      Recommendations:     Discharge Recommendations:  SNF vs. Psychiatric Facility  Discharge Equipment Recommendations:   RW, wheelchair, BSCC  Barriers to discharge:   unable to care for himself    Assessment:     Waldo Cedeno is a 70 y.o. male with a medical diagnosis of Acute cystitis.  He presents with impairment in cognition, limitations in functional mobility, balance, and ADL's. Performance deficits affecting function:  cognitive, muscle weakness.    Rehab Prognosis: Fair; patient would benefit from acute skilled OT services to address these deficits and reach maximum level of function.       Plan:     Patient to be seen   once daily M-F to address the above listed problems via  OT services  · Plan of Care Expires:  05/20/2020  · Plan of Care Reviewed with:  patient    Subjective     Chief Complaint: He is concerned about finding his dog.   Patient/Family Comments/goals: patient reports that he is worried about finding his dog, "Rags." He is unaware of his current location, as he states he is in [place]. He is [employer]    Occupational Profile:  Living Environment: lived alone prior  Previous level of function: unknown  Roles and Routines: unknown  Equipment Used at Home:   unknown  Assistance upon Discharge: Patient requires MAX Assist in ADL's due to cognitive limitations and balance impairment.    Pain/Comfort:  ·  no complaints of pain    Patients cultural, spiritual, Cheondoism conflicts given the current situation:  none    Objective:     Communicated with: RNMontse, prior to session.  Patient found HOB elevated with   upon OT entry to room.    General Precautions: Standard,   Fall,  Orthopedic Precautions:  NA  Braces:   NA    Occupational Performance:    Bed Mobility:    · Patient completed Rolling/Turning to Left with  maximal assistance  · Patient completed Scooting/Bridging with total " assistance x 2    Functional Mobility/Transfers:  · Patient completed Sit <> Stand Transfer with moderate assistance  with  rolling walker   · Functional Mobility: Unable to follow instructions safely in ambulation with use of RW    Activities of Daily Living:  · MIN Assist feeding  · MIN Assist grooming  · MOD Assist UB dressing  · MAX Assist LB dressing  · MAX Assist toileting  · MAX Assist bathing    Cognitive/Visual Perceptual:  Cognitive/Psychosocial Skills:     -       Oriented to: Person   -       Follows Commands/attention:Easily distracted and Mulitiple verbal and physical cues required to follow one step commands  -       Safety awareness/insight to disability: impaired     Physical Exam:  Balance:    -       Impaired static sitting balance; requires intermittent correction/cues due to falling/leaning back  Upper Extremity Range of Motion:     -       Right Upper Extremity: WFL  -       Left Upper Extremity: WFL   Strength:    -       Right Upper Extremity: WFL  -       Left Upper Extremity: WFL   Required multiple trials and cues to complete hand squeezing/ testing      [facility] 6 Click ADL:  AMPA Total Score:      Treatment & Education:  Patient completed bed mobility rolling to L requiring step by step VC's with physical prompting and hand placement to initiate/complete task as directed. Patient required MAX Assist to transfer HOB to EOB. Upon sitting upright, he is able to maintain static sitting, requiring intermittent cues and correction physically MIN Assist to correct balance due to leaning/falling backwards. Patient performed sit to stand with RW MOD Assist and MAX VC's for safety in procedure. Patient unable to stand fully erect and is deemed unsafe to take steps at this time with one therapist. Patient returned to bed edge and was instructed in attempted scooting up bed edge. Patient unable to complete task as directed/communicated due to cognitive status. Patient returned to supine MAX  Assist with MAX VC's to initiate. Patient unable to perform scooting/bridging as instructed. Total Assist x 2 to scoot up in bed.   Education:    Patient left HOB elevated with all lines intact, call button in reach and bed alarm on    GOALS:   Patient will maintain static sitting unsupported with ability to self correct balance with VC's only.   Patient will perform bedside chair transfer in stand pivot using RW MOD Assist.   Patient will perform bed mobility task scooting up in bed MOD assist.     History:     History reviewed. No pertinent past medical history.    History reviewed. No pertinent surgical history.    Time Tracking:     OT Date of Treatment:  05/13/2020  OT Start Time:  350  OT Stop Time:  413  OT Total Time (min):  23    Billable Minutes:Evaluation 15  Therapeutic Activity 8    Megha Escalante OT  5/13/2020

## 2020-05-13 NOTE — RESPIRATORY THERAPY
Attempted to administer nebulized breathing treatment to patient. Patient became verbally abusive and agitated. Terminated treatment immediately.

## 2020-05-13 NOTE — PLAN OF CARE

## 2020-05-13 NOTE — SUBJECTIVE & OBJECTIVE
Interval History:     Review of Systems   Constitutional: Positive for fatigue. Negative for activity change, appetite change and fever.   HENT: Negative for congestion, ear discharge, mouth sores, nosebleeds, rhinorrhea, sinus pressure, sinus pain and tinnitus.    Eyes: Negative.  Negative for pain, redness and itching.   Respiratory: Negative for apnea, cough, choking, chest tightness, shortness of breath, wheezing and stridor.    Cardiovascular: Negative for chest pain, palpitations and leg swelling.   Gastrointestinal: Negative for abdominal distention, abdominal pain, anal bleeding, blood in stool, constipation and diarrhea.   Endocrine: Negative.    Genitourinary: Negative for difficulty urinating, flank pain, frequency and urgency.   Musculoskeletal: Positive for arthralgias and myalgias. Negative for back pain and gait problem.   Skin: Negative for color change and pallor.   Allergic/Immunologic: Negative.    Neurological: Positive for weakness. Negative for dizziness, facial asymmetry, light-headedness and headaches.   Hematological: Negative for adenopathy. Does not bruise/bleed easily.   Psychiatric/Behavioral: Positive for behavioral problems. The patient is nervous/anxious.      Objective:     Vital Signs (Most Recent):  Temp: 97.8 °F (36.6 °C) (05/13/20 1500)  Pulse: 72 (05/13/20 1500)  Resp: 18 (05/13/20 1500)  BP: (!) 142/74 (05/13/20 1500)  SpO2: 95 % (05/13/20 1500) Vital Signs (24h Range):  Temp:  [96.9 °F (36.1 °C)-98.1 °F (36.7 °C)] 97.8 °F (36.6 °C)  Pulse:  [72-81] 72  Resp:  [18-20] 18  SpO2:  [92 %-99 %] 95 %  BP: (142-207)/(67-95) 142/74     Weight: 114.6 kg (252 lb 10.4 oz)  Body mass index is 36.25 kg/m².    Intake/Output Summary (Last 24 hours) at 5/13/2020 1647  Last data filed at 5/13/2020 1251  Gross per 24 hour   Intake 1070 ml   Output 800 ml   Net 270 ml      Physical Exam   Constitutional: He appears well-developed and well-nourished.   HENT:   Head: Normocephalic and atraumatic.    Eyes: Pupils are equal, round, and reactive to light. EOM are normal.   Neck: Normal range of motion. Neck supple. No tracheal deviation present. No thyromegaly present.   Cardiovascular: Normal rate, regular rhythm and normal heart sounds.   Pulmonary/Chest: Effort normal and breath sounds normal.   Abdominal: Soft. Bowel sounds are normal. He exhibits no distension. There is no tenderness. There is no rebound and no guarding.   Musculoskeletal: Normal range of motion.   Lymphadenopathy:     He has no cervical adenopathy.   Neurological: He is alert.   Skin: Skin is warm and dry. Capillary refill takes less than 2 seconds.   Nursing note and vitals reviewed.      Significant Labs:   Recent Lab Results       05/13/20  1044        Vancomycin-Trough 19.9         All pertinent labs within the past 24 hours have been reviewed.    Significant Imaging: I have reviewed and interpreted all pertinent imaging results/findings within the past 24 hours.

## 2020-05-13 NOTE — NURSING
Vanc trough resulted at 19.9. Spoke with pharmacy who gave instruction to hold vancomycin at this time. ROXANA, RN

## 2020-05-13 NOTE — ASSESSMENT & PLAN NOTE
Lab Results   Component Value Date    WBC 10.05 05/12/2020       No results found for this or any previous visit.  Results for orders placed or performed during the hospital encounter of 05/07/20   Urinalysis Microscopic   Result Value Ref Range    RBC, UA 15 (H) 0 - 4 /hpf    WBC, UA >100 (H) 0 - 5 /hpf    Bacteria Moderate (A) None-Occ /hpf    Hyaline Casts, UA 0 0-1/lpf /lpf    Microscopic Comment SEE COMMENT      Lactic acid 1.0  Urine and blood cultures pending  Cont IV Rocephin   hemodynamically stable    05/08/2020   Continue current antibiotics.  Change Rocephin to 1 g q.12 hours  Placement for patient on discharge.  Discussed with family further therapy options.    5/9/2020  Continue IV antibiotics  Awaiting placement on discharge  WBC wnl    5/10/2020  Patient stable, WBC wnl  Continue current therapy    05/11/2020  Patient is stable awaiting placement.  Cystitis is resolving  Repeat labs in the a.m. and patient is not accepted today    05/12/2020  Patient is stable can continuing to await placement.  Infection is resolving patient is at his baseline mental status.    05/13/2020:  Patient's cystitis is improving with antibiotic therapy.  Will continue antibiotics until placement  Patient awaiting placement  We will begin Seroquel 25 mg p.o. b.i.d. in place of diazepam.  Patient did have increasing behavioral issues on diazepam

## 2020-05-14 LAB
ALBUMIN SERPL BCP-MCNC: 3.4 G/DL (ref 3.5–5.2)
ALP SERPL-CCNC: 91 U/L (ref 55–135)
ALT SERPL W/O P-5'-P-CCNC: 53 U/L (ref 10–44)
ANION GAP SERPL CALC-SCNC: 6 MMOL/L (ref 8–16)
AST SERPL-CCNC: 34 U/L (ref 10–40)
BILIRUB SERPL-MCNC: 0.2 MG/DL (ref 0.1–1)
BUN SERPL-MCNC: 24 MG/DL (ref 8–23)
CALCIUM SERPL-MCNC: 9.1 MG/DL (ref 8.7–10.5)
CHLORIDE SERPL-SCNC: 108 MMOL/L (ref 95–110)
CO2 SERPL-SCNC: 26 MMOL/L (ref 23–29)
CREAT SERPL-MCNC: 0.8 MG/DL (ref 0.5–1.4)
EST. GFR  (AFRICAN AMERICAN): >60 ML/MIN/1.73 M^2
EST. GFR  (NON AFRICAN AMERICAN): >60 ML/MIN/1.73 M^2
GLUCOSE SERPL-MCNC: 107 MG/DL (ref 70–110)
POTASSIUM SERPL-SCNC: 4.1 MMOL/L (ref 3.5–5.1)
PROT SERPL-MCNC: 8 G/DL (ref 6–8.4)
SODIUM SERPL-SCNC: 140 MMOL/L (ref 136–145)
VANCOMYCIN SERPL-MCNC: 9.6 UG/ML

## 2020-05-14 PROCEDURE — 80053 COMPREHEN METABOLIC PANEL: CPT

## 2020-05-14 PROCEDURE — 94761 N-INVAS EAR/PLS OXIMETRY MLT: CPT

## 2020-05-14 PROCEDURE — 94640 AIRWAY INHALATION TREATMENT: CPT

## 2020-05-14 PROCEDURE — 63600175 PHARM REV CODE 636 W HCPCS: Performed by: INTERNAL MEDICINE

## 2020-05-14 PROCEDURE — 36415 COLL VENOUS BLD VENIPUNCTURE: CPT

## 2020-05-14 PROCEDURE — 25000003 PHARM REV CODE 250: Performed by: INTERNAL MEDICINE

## 2020-05-14 PROCEDURE — 99232 SBSQ HOSP IP/OBS MODERATE 35: CPT | Mod: ,,, | Performed by: INTERNAL MEDICINE

## 2020-05-14 PROCEDURE — 25000003 PHARM REV CODE 250: Performed by: HOSPITALIST

## 2020-05-14 PROCEDURE — 80202 ASSAY OF VANCOMYCIN: CPT

## 2020-05-14 PROCEDURE — 25000003 PHARM REV CODE 250: Performed by: FAMILY MEDICINE

## 2020-05-14 PROCEDURE — 99232 PR SUBSEQUENT HOSPITAL CARE,LEVL II: ICD-10-PCS | Mod: ,,, | Performed by: INTERNAL MEDICINE

## 2020-05-14 PROCEDURE — 63600175 PHARM REV CODE 636 W HCPCS: Performed by: HOSPITALIST

## 2020-05-14 PROCEDURE — 97535 SELF CARE MNGMENT TRAINING: CPT

## 2020-05-14 PROCEDURE — 99900035 HC TECH TIME PER 15 MIN (STAT)

## 2020-05-14 PROCEDURE — 25000242 PHARM REV CODE 250 ALT 637 W/ HCPCS: Performed by: FAMILY MEDICINE

## 2020-05-14 PROCEDURE — 11000001 HC ACUTE MED/SURG PRIVATE ROOM

## 2020-05-14 RX ADMIN — IPRATROPIUM BROMIDE AND ALBUTEROL SULFATE 3 ML: .5; 3 SOLUTION RESPIRATORY (INHALATION) at 07:05

## 2020-05-14 RX ADMIN — IBUPROFEN 400 MG: 400 TABLET, FILM COATED ORAL at 12:05

## 2020-05-14 RX ADMIN — QUETIAPINE FUMARATE 25 MG: 25 TABLET ORAL at 08:05

## 2020-05-14 RX ADMIN — VANCOMYCIN HYDROCHLORIDE 1500 MG: 1.5 INJECTION, POWDER, LYOPHILIZED, FOR SOLUTION INTRAVENOUS at 08:05

## 2020-05-14 RX ADMIN — CEFTRIAXONE SODIUM 1 G: 1 INJECTION, POWDER, FOR SOLUTION INTRAMUSCULAR; INTRAVENOUS at 05:05

## 2020-05-14 RX ADMIN — CLORAZEPATE DIPOTASSIUM 7.5 MG: 7.5 TABLET ORAL at 08:05

## 2020-05-14 RX ADMIN — POTASSIUM CHLORIDE 20 MEQ: 1500 TABLET, EXTENDED RELEASE ORAL at 08:05

## 2020-05-14 RX ADMIN — ENOXAPARIN SODIUM 40 MG: 100 INJECTION SUBCUTANEOUS at 05:05

## 2020-05-14 RX ADMIN — AMLODIPINE BESYLATE 5 MG: 2.5 TABLET ORAL at 08:05

## 2020-05-14 RX ADMIN — SENNOSIDES AND DOCUSATE SODIUM 1 TABLET: 8.6; 5 TABLET ORAL at 08:05

## 2020-05-14 RX ADMIN — VANCOMYCIN HYDROCHLORIDE 1500 MG: 1.5 INJECTION, POWDER, LYOPHILIZED, FOR SOLUTION INTRAVENOUS at 07:05

## 2020-05-14 NOTE — PLAN OF CARE
"   05/14/20 1629   Discharge Reassessment   Assessment Type Discharge Planning Reassessment   Anticipated Discharge Disposition Psych     SW contacted by Marni from [facility] in [place] to inform the DON reviewed the pts recent records and feel the pt is "too medical" for acceptance to their unit.     SW will continue effort for placement of this pt.   "

## 2020-05-14 NOTE — NURSING
IV JUAN CARLOS INADVERTENTLY PULLED OUT. NO BLEEDING AT SITE. DESPITE REPEATED ATTEMPTS TO ENCOURAGE PT. COOPERATION, HE REFUSES AT THIS TIME TO HAVE NEW IV SITE INITIATED. DR. BETO ANN NOTIFIED.

## 2020-05-14 NOTE — PROGRESS NOTES
[facility] Hospital Medicine  Progress Note    Patient Name: Waldo Cedeno  MRN: [id number]  Patient Class: IP- Inpatient   Admission Date: 5/7/2020  Length of Stay: 7 days  Attending Physician: Mohamud Fregoso MD  Primary Care Provider: Primary Doctor No        Subjective:     Principal Problem:Acute cystitis        HPI:  History of Present Illness:  Patient is a 70 y.o. male who has no past medical history on file presented to ED after EMS was called to patients house for reported fall. Patient is currently confused and agitated. He is oriented to self only and angry about being in an unfamiliar environment. He is poor historian and unable to provide history. No family at bedside. Patient states he feels fine. Denies any pain. No evidence of trauma on patient. He lives alone and per reports he is not able to care for himself. Family unable to provide assistance. He was found to have UTI and will be admitted for IV abx with possible SNF vs NH placement after discharge.    Overview/Hospital Course:  05/08/2020:  Patient is a 70-year-old male that was admitted last evening with reportedly having fallen at home and is unable to care for himself.  Patient states he does not know why he is here he feels fine.  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  Vital signs showed blood pressure mildly elevated 163/67 pulse 73 respirations 18 temperature 99.5° the and O2 sat 93%.  Patient showed T-max of 101.1° overnight.  Labs show a white blood cell count of 14.3 hemoglobin 13.6 hematocrit 40 differential shows 77 granulocytes 11 lymphocytes sed rate greater than 90.  Chemistry shows sodium 135 potassium 3.2 chloride 101 bicarb 24 anion gap 10 BUN 16 creatinine 1.0 and GFR greater than 60  CT of the brain revealed cortical atrophy deep white matter changes mild-to-moderate hydrocephalus.  Chest x-ray revealed no acute chest  "disease.    5/9/2020  Patient stable. Denies any pain. Still with confusion but very talkative and alert. WBC has normalized. Labs are stable. Patient now awaiting placement. Will continue on IV antibiotics.    5/10/2020  Patient stable. No change in care plan.     05/11/2020:  Patient is continued to be stable labs were reviewed and unremarkable  White blood cell count normal hemoglobin 13.1 hematocrit 39  CMP completely normal  BP (!) 115/50 (BP Location: Left arm, Patient Position: Lying)   Pulse 79   Temp 96.4 °F (35.8 °C) (Axillary)   Resp 18   Ht 5' 10" (1.778 m)   Wt 114.6 kg (252 lb 10.4 oz)   SpO2 99%   BMI 36.25 kg/m²       05/12/2020:  BP (!) 142/89 (BP Location: Right arm, Patient Position: Lying)   Pulse 78   Temp 97 °F (36.1 °C) (Axillary)   Resp 17   Ht 5' 10" (1.778 m)   Wt 114.6 kg (252 lb 10.4 oz)   SpO2 96%   BMI 36.25 kg/m²     Blood cultures negative to date.  Comprehensive metabolic panel normal except for albumin 3.3  CBC normal white blood cell count 10.0 hemoglobin 13.7 hematocrit 40 and differential was normal    05/13/2020:  BP (!) 142/74 (BP Location: Right arm, Patient Position: Lying)   Pulse 72   Temp 97.8 °F (36.6 °C) (Oral)   Resp 18   Ht 5' 10" (1.778 m)   Wt 114.6 kg (252 lb 10.4 oz)   SpO2 95%   BMI 36.25 kg/m²     Vancomycin trough is 19.9 phosphorus and magnesium are normal.  Comprehensive metabolic panel normal  Patient is awaiting placement for rehab./or long-term placement.  Patient would benefit greatly from psychiatric evaluation and treatment    05/14/2020:  BP (!) 142/86 (BP Location: Right arm, Patient Position: Lying)   Pulse 84   Temp 98.1 °F (36.7 °C) (Oral)   Resp 18   Ht 5' 10" (1.778 m)   Wt 114.6 kg (252 lb 10.4 oz)   SpO2 96%   BMI 36.25 kg/m²     Chem profile completely normal liver ALT 53 albumin 3.4 and BUN 24 creatinine 0.8  Patient awaits placement for mental health issues.          Interval History:     Review of Systems "   Constitutional: Positive for fatigue. Negative for activity change, appetite change and fever.   HENT: Negative for congestion, ear discharge, mouth sores, nosebleeds, rhinorrhea, sinus pressure, sinus pain and tinnitus.    Eyes: Negative.  Negative for pain, redness and itching.   Respiratory: Negative for apnea, cough, choking, chest tightness, shortness of breath, wheezing and stridor.    Cardiovascular: Negative for chest pain, palpitations and leg swelling.   Gastrointestinal: Positive for abdominal pain. Negative for abdominal distention, anal bleeding, blood in stool, constipation and diarrhea.   Endocrine: Negative.    Genitourinary: Negative for difficulty urinating, flank pain, frequency and urgency.   Musculoskeletal: Positive for arthralgias and myalgias. Negative for back pain and gait problem.   Skin: Negative for color change and pallor.   Allergic/Immunologic: Negative.    Neurological: Positive for weakness. Negative for dizziness, facial asymmetry, light-headedness and headaches.   Hematological: Negative for adenopathy. Does not bruise/bleed easily.   Psychiatric/Behavioral: Positive for behavioral problems. The patient is nervous/anxious.      Objective:     Vital Signs (Most Recent):  Temp: 98.1 °F (36.7 °C) (05/14/20 1030)  Pulse: 84 (05/14/20 1030)  Resp: 18 (05/14/20 1030)  BP: (!) 142/86 (05/14/20 1030)  SpO2: 96 % (05/14/20 1030) Vital Signs (24h Range):  Temp:  [96.2 °F (35.7 °C)-98.1 °F (36.7 °C)] 98.1 °F (36.7 °C)  Pulse:  [66-84] 84  Resp:  [16-18] 18  SpO2:  [95 %-98 %] 96 %  BP: (141-149)/(74-86) 142/86     Weight: 114.6 kg (252 lb 10.4 oz)  Body mass index is 36.25 kg/m².    Intake/Output Summary (Last 24 hours) at 5/14/2020 1327  Last data filed at 5/14/2020 1100  Gross per 24 hour   Intake 290 ml   Output 400 ml   Net -110 ml      Physical Exam   Constitutional: He is oriented to person, place, and time. He appears well-developed and well-nourished.   HENT:   Head: Normocephalic  and atraumatic.   Eyes: Pupils are equal, round, and reactive to light. Conjunctivae and EOM are normal.   Neck: Normal range of motion. Neck supple. No tracheal deviation present. No thyromegaly present.   Cardiovascular: Normal rate, regular rhythm and normal heart sounds.   Pulmonary/Chest: Effort normal. He has wheezes.   Abdominal: Soft. Bowel sounds are normal. He exhibits no distension. There is no tenderness. There is no rebound and no guarding.   Musculoskeletal: Normal range of motion.   Lymphadenopathy:     He has no cervical adenopathy.   Neurological: He is alert and oriented to person, place, and time.   Skin: Skin is warm and dry. Capillary refill takes less than 2 seconds.   Psychiatric: He has a normal mood and affect. His behavior is normal. Judgment and thought content normal.   Nursing note and vitals reviewed.      Significant Labs:   Recent Lab Results       05/14/20  0813   05/14/20  0531        Albumin 3.4       Alkaline Phosphatase 91       ALT 53       Anion Gap 6       AST 34       BILIRUBIN TOTAL 0.2  Comment:  For infants and newborns, interpretation of results should be based  on gestational age, weight and in agreement with clinical  observations.  Premature Infant recommended reference ranges:  Up to 24 hours.............<8.0 mg/dL  Up to 48 hours............<12.0 mg/dL  3-5 days..................<15.0 mg/dL  6-29 days.................<15.0 mg/dL         BUN, Bld 24       Calcium 9.1       Chloride 108       CO2 26       Creatinine 0.8       eGFR if  >60.0       eGFR if non  >60.0  Comment:  Calculation used to obtain the estimated glomerular filtration  rate (eGFR) is the CKD-EPI equation.          Glucose 107       Potassium 4.1       PROTEIN TOTAL 8.0       Sodium 140       Vancomycin, Random   9.6         All pertinent labs within the past 24 hours have been reviewed.    Significant Imaging: I have reviewed and interpreted all pertinent imaging  results/findings within the past 24 hours.      Assessment/Plan:      * Acute cystitis  Lab Results   Component Value Date    WBC 10.05 05/12/2020       No results found for this or any previous visit.  Results for orders placed or performed during the hospital encounter of 05/07/20   Urinalysis Microscopic   Result Value Ref Range    RBC, UA 15 (H) 0 - 4 /hpf    WBC, UA >100 (H) 0 - 5 /hpf    Bacteria Moderate (A) None-Occ /hpf    Hyaline Casts, UA 0 0-1/lpf /lpf    Microscopic Comment SEE COMMENT      Lactic acid 1.0  Urine and blood cultures pending  Cont IV Rocephin   hemodynamically stable    05/08/2020   Continue current antibiotics.  Change Rocephin to 1 g q.12 hours  Placement for patient on discharge.  Discussed with family further therapy options.    5/9/2020  Continue IV antibiotics  Awaiting placement on discharge  WBC wnl    5/10/2020  Patient stable, WBC wnl  Continue current therapy    05/11/2020  Patient is stable awaiting placement.  Cystitis is resolving  Repeat labs in the a.m. and patient is not accepted today    05/12/2020  Patient is stable can continuing to await placement.  Infection is resolving patient is at his baseline mental status.    05/13/2020:  Patient's cystitis is improving with antibiotic therapy.  Will continue antibiotics until placement  Patient awaiting placement  We will begin Seroquel 25 mg p.o. b.i.d. in place of diazepam.  Patient did have increasing behavioral issues on diazepam    05/14/2020  Cystitis is improving patient is nontoxic and stable.  Vital signs stable and labs today revealed no acute abnormalities.  GFR greater than 60  Patient is ready for placement with [facility] or long-term care.      Alcohol abuse  Heavy daily alcohol use per family  Will start on valium taper  PRN ativan     05/11/2020:  Continue anxiolytics of transient 50 mg b.i.d.  Follow-up labs as needed.  Await for transfer to long term care facility hopefully today patient is ready for  discharge    Fall  Xray pending  Fall precautions    Septic encephalopathy  Secondary to above  Patient with worsening dementia   Baseline unclear. Will need to discuss with family.   CT head: pending  Cont neuro checks  Tele sitter  Fall precautions  Aspiration precautions  Delirium precautions:  Avoid antihistamines, anticholinergics, hypnotics, and minimize opiates while controlling for pain as these medications may exacerbate delirium. Cues for day/night will assist with keeping patient calm and oriented - during daytime, please keep adequate light in room (open windows, lights on) and please keep room dim at night-time to encourage normal sleep-wake cycles    5/9/2020  Continue above measures for delirium/confusion  Fall precautions  Continue IV antibiotics  Labs have normalized    5/10/2020  Patient with dementia, delirium  Stable but no change in mental status from prior exams    05/11/2020:  Dementia and delirium are continuing however mildly improved.  We await transfer for to long-term care facility.  Patient is not able to live alone.    05/12/2020  Dementia is stable  Awaiting transfer to long-term care facility.      VTE Risk Mitigation (From admission, onward)         Ordered     enoxaparin injection 40 mg  Daily      05/07/20 2214     IP VTE HIGH RISK PATIENT  Once      05/07/20 2214     Place sequential compression device  Until discontinued      05/07/20 2214                      Mohamud rFegoso MD  Department of Hospital Medicine   [facility]

## 2020-05-14 NOTE — SUBJECTIVE & OBJECTIVE
Interval History:     Review of Systems   Constitutional: Positive for fatigue. Negative for activity change, appetite change and fever.   HENT: Negative for congestion, ear discharge, mouth sores, nosebleeds, rhinorrhea, sinus pressure, sinus pain and tinnitus.    Eyes: Negative.  Negative for pain, redness and itching.   Respiratory: Negative for apnea, cough, choking, chest tightness, shortness of breath, wheezing and stridor.    Cardiovascular: Negative for chest pain, palpitations and leg swelling.   Gastrointestinal: Positive for abdominal pain. Negative for abdominal distention, anal bleeding, blood in stool, constipation and diarrhea.   Endocrine: Negative.    Genitourinary: Negative for difficulty urinating, flank pain, frequency and urgency.   Musculoskeletal: Positive for arthralgias and myalgias. Negative for back pain and gait problem.   Skin: Negative for color change and pallor.   Allergic/Immunologic: Negative.    Neurological: Positive for weakness. Negative for dizziness, facial asymmetry, light-headedness and headaches.   Hematological: Negative for adenopathy. Does not bruise/bleed easily.   Psychiatric/Behavioral: Positive for behavioral problems. The patient is nervous/anxious.      Objective:     Vital Signs (Most Recent):  Temp: 98.1 °F (36.7 °C) (05/14/20 1030)  Pulse: 84 (05/14/20 1030)  Resp: 18 (05/14/20 1030)  BP: (!) 142/86 (05/14/20 1030)  SpO2: 96 % (05/14/20 1030) Vital Signs (24h Range):  Temp:  [96.2 °F (35.7 °C)-98.1 °F (36.7 °C)] 98.1 °F (36.7 °C)  Pulse:  [66-84] 84  Resp:  [16-18] 18  SpO2:  [95 %-98 %] 96 %  BP: (141-149)/(74-86) 142/86     Weight: 114.6 kg (252 lb 10.4 oz)  Body mass index is 36.25 kg/m².    Intake/Output Summary (Last 24 hours) at 5/14/2020 1327  Last data filed at 5/14/2020 1100  Gross per 24 hour   Intake 290 ml   Output 400 ml   Net -110 ml      Physical Exam   Constitutional: He is oriented to person, place, and time. He appears well-developed and  well-nourished.   HENT:   Head: Normocephalic and atraumatic.   Eyes: Pupils are equal, round, and reactive to light. Conjunctivae and EOM are normal.   Neck: Normal range of motion. Neck supple. No tracheal deviation present. No thyromegaly present.   Cardiovascular: Normal rate, regular rhythm and normal heart sounds.   Pulmonary/Chest: Effort normal. He has wheezes.   Abdominal: Soft. Bowel sounds are normal. He exhibits no distension. There is no tenderness. There is no rebound and no guarding.   Musculoskeletal: Normal range of motion.   Lymphadenopathy:     He has no cervical adenopathy.   Neurological: He is alert and oriented to person, place, and time.   Skin: Skin is warm and dry. Capillary refill takes less than 2 seconds.   Psychiatric: He has a normal mood and affect. His behavior is normal. Judgment and thought content normal.   Nursing note and vitals reviewed.      Significant Labs:   Recent Lab Results       05/14/20  0813   05/14/20  0531        Albumin 3.4       Alkaline Phosphatase 91       ALT 53       Anion Gap 6       AST 34       BILIRUBIN TOTAL 0.2  Comment:  For infants and newborns, interpretation of results should be based  on gestational age, weight and in agreement with clinical  observations.  Premature Infant recommended reference ranges:  Up to 24 hours.............<8.0 mg/dL  Up to 48 hours............<12.0 mg/dL  3-5 days..................<15.0 mg/dL  6-29 days.................<15.0 mg/dL         BUN, Bld 24       Calcium 9.1       Chloride 108       CO2 26       Creatinine 0.8       eGFR if  >60.0       eGFR if non  >60.0  Comment:  Calculation used to obtain the estimated glomerular filtration  rate (eGFR) is the CKD-EPI equation.          Glucose 107       Potassium 4.1       PROTEIN TOTAL 8.0       Sodium 140       Vancomycin, Random   9.6         All pertinent labs within the past 24 hours have been reviewed.    Significant Imaging: I have  reviewed and interpreted all pertinent imaging results/findings within the past 24 hours.

## 2020-05-14 NOTE — PT/OT/SLP PROGRESS
"Patient:  Waldo Cedeno  [facility] #:  [id number]   Date of Note: 05/14/2020   Referring Physician:  Self, Aaareferral  Diagnosis:  Acute cystitis    Start Time: 256  End Time: 312  Total Time: 16 min        Subjective:   "I am so sleepy."    Pain: Patient reported neck pain while mobilizing, however, patient did not rate pain.      Objective:   Patient was very lethargic during session.  OT strongly encouraged patient to mobilize.  Patient was aroused once he was mobilized and was able to engage in session.      Treatment:   Patient performed UE dressing and required maxA.  Patient performed LE dressing and required maxA.  Patient performed BUE A/AROM for shoulder flexion 1x10 reps.  Patient performed BUE A/AROM for elbow flexion 1x10 reps.      Assessment:  Patient tolerated session fair on this date with OT.  Patient was very lethargic initially during session.  OT educated patient on the benefits of mobilizing.  Patient required total assistance x1 with all aspects of bed mobility.  Patient required CGA in order to maintain sitting balance while sitting at edge of bed.  Patient required maxA when performing UE dressing.  Patient also required maxA when performing LE dressing.  Patient performed BUE A/AROM therapeutic exercises during session.  Patient will benefit from skilled nursing placement upon discharge.           Plan:  Continue with current POC Monday-Friday to improve functional deficits.        "

## 2020-05-14 NOTE — PLAN OF CARE
05/14/20 1629   Discharge Reassessment   Assessment Type Discharge Planning Reassessment   Anticipated Discharge Disposition Psych     Follow up with [facility] this day regarding status of referral. Per Lorelei Negron ([phone]) pt can be considered for admission once psychiatric treatment is complete. Contact made with [facility] to request pt remain on their waiting list for a bed when available. Per Shira ([phone]) provider reviewed again and did not want to accept secondary to alcoholism. SW inquired if provider would reconsider if unable to secure placement elsewhere after amount of days pass and there are not medical complications. Shira stated she would discuss with provider when he rounds on 05/15/20. SW requested she obtain amount of time the provider would require if he is willing to reconsider.     Contact made with [facility] in [place] again to inquire about bed availability. Representative informed male bed is open and they will evaluate again for acceptance. SW faxed updated progress notes, labs and therapy noted for up to date review. Awaiting response on acceptance. SW will continue to follow for placement.

## 2020-05-14 NOTE — ASSESSMENT & PLAN NOTE
Lab Results   Component Value Date    WBC 10.05 05/12/2020       No results found for this or any previous visit.  Results for orders placed or performed during the hospital encounter of 05/07/20   Urinalysis Microscopic   Result Value Ref Range    RBC, UA 15 (H) 0 - 4 /hpf    WBC, UA >100 (H) 0 - 5 /hpf    Bacteria Moderate (A) None-Occ /hpf    Hyaline Casts, UA 0 0-1/lpf /lpf    Microscopic Comment SEE COMMENT      Lactic acid 1.0  Urine and blood cultures pending  Cont IV Rocephin   hemodynamically stable    05/08/2020   Continue current antibiotics.  Change Rocephin to 1 g q.12 hours  Placement for patient on discharge.  Discussed with family further therapy options.    5/9/2020  Continue IV antibiotics  Awaiting placement on discharge  WBC wnl    5/10/2020  Patient stable, WBC wnl  Continue current therapy    05/11/2020  Patient is stable awaiting placement.  Cystitis is resolving  Repeat labs in the a.m. and patient is not accepted today    05/12/2020  Patient is stable can continuing to await placement.  Infection is resolving patient is at his baseline mental status.    05/13/2020:  Patient's cystitis is improving with antibiotic therapy.  Will continue antibiotics until placement  Patient awaiting placement  We will begin Seroquel 25 mg p.o. b.i.d. in place of diazepam.  Patient did have increasing behavioral issues on diazepam    05/14/2020  Cystitis is improving patient is nontoxic and stable.  Vital signs stable and labs today revealed no acute abnormalities.  GFR greater than 60  [facility]

## 2020-05-14 NOTE — PLAN OF CARE
05/13/20 1530   Discharge Reassessment   Assessment Type Discharge Planning Reassessment   Anticipated Discharge Disposition Psych     Pt was referred to [facility] in [place], [place] for inpatient corazon psych treatment while awaiting response from multiple nursing [facility] and [facility].     [facility] responded pt could be accepted to their facility, but discharges are not planned until Mon 05/18/20 or Tue 05/19/20 for his admission. Natasha Trace contated SW and informed they were unable to accept for admission their facility.     Contact and conversation made with Esau Villegas's office requesting assistance and/or support with pts situation. Based on pts condition he is not appropriate for [facility], but they will consider legal support if needed for private facility that is willing to accept.      SW will continue to follow and assist for placement as needed.

## 2020-05-15 VITALS
HEIGHT: 70 IN | TEMPERATURE: 96 F | RESPIRATION RATE: 17 BRPM | WEIGHT: 252.63 LBS | OXYGEN SATURATION: 97 % | BODY MASS INDEX: 36.17 KG/M2 | SYSTOLIC BLOOD PRESSURE: 172 MMHG | HEART RATE: 74 BPM | DIASTOLIC BLOOD PRESSURE: 82 MMHG

## 2020-05-15 PROBLEM — N30.00 ACUTE CYSTITIS: Status: RESOLVED | Noted: 2020-05-07 | Resolved: 2020-05-15

## 2020-05-15 PROBLEM — F31.9 BIPOLAR 1 DISORDER: Status: ACTIVE | Noted: 2020-05-15

## 2020-05-15 LAB — VANCOMYCIN TROUGH SERPL-MCNC: 22.3 UG/ML (ref 10–22)

## 2020-05-15 PROCEDURE — 99900035 HC TECH TIME PER 15 MIN (STAT)

## 2020-05-15 PROCEDURE — 99239 HOSP IP/OBS DSCHRG MGMT >30: CPT | Mod: ,,, | Performed by: INTERNAL MEDICINE

## 2020-05-15 PROCEDURE — 36415 COLL VENOUS BLD VENIPUNCTURE: CPT

## 2020-05-15 PROCEDURE — 99239 PR HOSPITAL DISCHARGE DAY,>30 MIN: ICD-10-PCS | Mod: ,,, | Performed by: INTERNAL MEDICINE

## 2020-05-15 PROCEDURE — 25000003 PHARM REV CODE 250: Performed by: INTERNAL MEDICINE

## 2020-05-15 PROCEDURE — 80202 ASSAY OF VANCOMYCIN: CPT

## 2020-05-15 PROCEDURE — 63600175 PHARM REV CODE 636 W HCPCS: Performed by: HOSPITALIST

## 2020-05-15 PROCEDURE — 97802 MEDICAL NUTRITION INDIV IN: CPT

## 2020-05-15 PROCEDURE — 63600175 PHARM REV CODE 636 W HCPCS: Performed by: INTERNAL MEDICINE

## 2020-05-15 PROCEDURE — 25000003 PHARM REV CODE 250: Performed by: FAMILY MEDICINE

## 2020-05-15 PROCEDURE — 25000003 PHARM REV CODE 250: Performed by: HOSPITALIST

## 2020-05-15 RX ORDER — AMLODIPINE BESYLATE 5 MG/1
5 TABLET ORAL DAILY
Qty: 30 TABLET | Refills: 11 | Status: SHIPPED | OUTPATIENT
Start: 2020-05-16 | End: 2021-05-16

## 2020-05-15 RX ORDER — QUETIAPINE FUMARATE 25 MG/1
25 TABLET, FILM COATED ORAL 2 TIMES DAILY
Qty: 60 TABLET | Refills: 11 | Status: SHIPPED | OUTPATIENT
Start: 2020-05-15 | End: 2021-05-15

## 2020-05-15 RX ADMIN — CEFTRIAXONE SODIUM 1 G: 1 INJECTION, POWDER, FOR SOLUTION INTRAMUSCULAR; INTRAVENOUS at 05:05

## 2020-05-15 RX ADMIN — LORAZEPAM 1 MG: 2 INJECTION INTRAMUSCULAR; INTRAVENOUS at 07:05

## 2020-05-15 RX ADMIN — ACETAMINOPHEN 650 MG: 325 TABLET ORAL at 03:05

## 2020-05-15 RX ADMIN — QUETIAPINE FUMARATE 25 MG: 25 TABLET ORAL at 08:05

## 2020-05-15 RX ADMIN — SENNOSIDES AND DOCUSATE SODIUM 1 TABLET: 8.6; 5 TABLET ORAL at 08:05

## 2020-05-15 RX ADMIN — POTASSIUM CHLORIDE 20 MEQ: 1500 TABLET, EXTENDED RELEASE ORAL at 08:05

## 2020-05-15 RX ADMIN — AMLODIPINE BESYLATE 5 MG: 2.5 TABLET ORAL at 08:05

## 2020-05-15 NOTE — CLINICAL REVIEW
"  [facility]  Adult Nutrition  Consult Note    SUMMARY     Recommendations   1. Pt to continue to consume >75% of regular diet  Goals: 1. Pt to tolerate consuming >75% of diet  Nutrition Goal Status: new    Reason for Assessment    Reason For Assessment: length of stay  Diagnosis: infection/sepsis    Dx: no nutritional diagnosis at this time.     Relevant Medical History: No PMH in chart  Interdisciplinary Rounds: (RD plans to attend)  General Information Comments: Pt is aggressive psych patient. All information obtained from chart or RN. Last BM was 5/12. Intake is % (no need for supplements), No Swallowing/chewing difficulty. No N/V/D/C. Diet history unknown due to mental health. Per RN & chart, no nutritional diagnosis at this time.   Nutrition Discharge Planning: Pt to be d/c on regular diet or per MD    Nutrition Risk Screen    Nutrition Risk Screen: no indicators present    Nutrition/Diet History    Typical Food/Fluid Intake: Diet history unknown due to mental health  Spiritual, Cultural Beliefs, Jehovah's witness Practices, Values that Affect Care: no  Factors Affecting Nutritional Intake: None identified at this time    Anthropometrics    Temp: 97.8 °F (36.6 °C)  Height Method: Stated  Height: 5' 10" (177.8 cm)  Height (inches): 70 in  Weight Method: Bed Scale  Weight: 114.6 kg (252 lb 10.4 oz)  Weight (lb): 252.65 lb  Ideal Body Weight (IBW), Male: 166 lb  % Ideal Body Weight, Male (lb): 152.2 %  BMI (Calculated): 36.3       Lab/Procedures/Meds    Pertinent Labs Reviewed: reviewed  Pertinent Labs Comments: BUn 24, Album 3.4  Pertinent Medications Reviewed: reviewed  Pertinent Medications Comments: Senna    Physical Findings/Assessment         Estimated/Assessed Needs    Weight Used For Calorie Calculations: 89.6 kg (197 lb 8.5 oz)(Adjusted body weight)  Energy Calorie Requirements (kcal): 7448-0998 kcal per day(25-30 kcal/kg)  Energy Need Method: Kcal/kg  Protein Requirements: " 71-89 g per day(.8-1 g/kg)  Weight Used For Protein Calculations: 89.6 kg (197 lb 8.5 oz)(adjusted body weight)  Fluid Requirements (mL): 1 ml/kcal  Estimated Fluid Requirement Method: RDA Method  RDA Method (mL): 2240         Nutrition Prescription Ordered    Current Diet Order: Regular diet    Evaluation of Received Nutrient/Fluid Intake    Energy Calories Required: meeting needs  Protein Required: meeting needs  Fluid Required: meeting needs  Tolerance: tolerating  % Intake of Estimated Energy Needs: 75 - 100 %  % Meal Intake: 75 - 100 %    Nutrition Risk    Level of Risk/Frequency of Follow-up: comfort care     Assessment and Plan    No new Assessment & Plan notes have been filed under this hospital service since the last note was generated.  Service: Nutrition       Monitor and Evaluation    Food and Nutrient Intake: energy intake, food and beverage intake  Food and Nutrient Adminstration: diet order  Knowledge/Beliefs/Attitudes: food and nutrition knowledge/skill  Anthropometric Measurements: weight  Biochemical Data, Medical Tests and Procedures: electrolyte and renal panel, lipid profile, gastrointestinal profile  Nutrition-Focused Physical Findings: overall appearance     Nutrition Follow-Up    RD Follow-up?: Yes

## 2020-05-15 NOTE — PROGRESS NOTES
Pharmacokinetic Assessment Follow Up: IV Vancomycin    Vancomycin serum concentration assessment(s):    Vancomycin level: 22.3    Vancomycin Regimen Plan:  Will reduce vancomycin to 1750mg q18hrs    Drug levels (last 3 results):  Recent Labs   Lab Result Units 05/13/20  1044 05/14/20  0531 05/15/20  0757   Vancomycin, Random ug/mL  --  9.6  --    Vancomycin-Trough ug/mL 19.9  --  22.3*       Pharmacy will continue to follow and monitor vancomycin.    Please contact pharmacy at extension [phone] for questions regarding this assessment.    Thank you for the consult,   Negar Melton       Patient brief summary:  Waldo Cedeno is a 70 y.o. male initiated on antimicrobial therapy with IV Vancomycin for treatment of 1750mg q12hrs    The patient's current regimen is 1750mg q18hrs    Drug Allergies:   Review of patient's allergies indicates:  No Known Allergies    Actual Body Weight:   114.6    Renal Function:   Estimated Creatinine Clearance: 108.9 mL/min (based on SCr of 0.8 mg/dL).,         CBC (last 72 hours):  No results for input(s): WHITE BLOOD CELL COUNT, HEMOGLOBIN, HEMATOCRIT, PLATELETS, GRAN%, LYMPH%, MONO%, EOSINOPHIL%, BASOPHIL%, DIFFERENTIAL METHOD in the last 72 hours.    Metabolic Panel (last 72 hours):  Recent Labs   Lab Result Units 05/14/20  0813   Sodium mmol/L 140   Potassium mmol/L 4.1   Chloride mmol/L 108   CO2 mmol/L 26   Glucose mg/dL 107   BUN, Bld mg/dL 24*   Creatinine mg/dL 0.8   Albumin g/dL 3.4*   Total Bilirubin mg/dL 0.2   Alkaline Phosphatase U/L 91   AST U/L 34   ALT U/L 53*       Vancomycin Administrations:  vancomycin given in the last 96 hours                     vancomycin (VANCOCIN) 1,500 mg in dextrose 5 % 250 mL IVPB (mg) 1,500 mg New Bag 05/14/20 1948     1,500 mg New Bag  0840     1,500 mg New Bag 05/12/20 2120     1,500 mg New Bag  1209     1,500 mg New Bag 05/11/20 2243                      Microbiologic Results:  Microbiology Results (last 7 days)       Procedure Component  Value Units Date/Time    Blood culture [[id number]] Collected:  05/07/20 2110    Order Status:  Completed Specimen:  Blood from Peripheral, Forearm, Left Updated:  05/12/20 1212     Blood Culture, Routine No Growth after 4 days.     Narrative:       Specimen # 1    Blood culture [[id number]]  (Abnormal) Collected:  05/07/20 2124    Order Status:  Completed Specimen:  Blood from Peripheral, Forearm, Left Updated:  05/11/20 1031     Blood Culture, Routine Gram stain peds bottle: Gram positive cocci in clusters resembling Staph       Results called to and read back by: Jose Stevenson RN 05/09/2020        03:02      COAGULASE-NEGATIVE STAPHYLOCOCCUS SPECIES  Organism is a probable contaminant      Narrative:       Specimen # 2    Urine culture [[id number]]  (Abnormal)  (Susceptibility) Collected:  05/07/20 1931    Order Status:  Completed Specimen:  Urine Updated:  05/10/20 1205     Urine Culture, Routine ESCHERICHIA COLI  >100,000 cfu/ml      Narrative:       Preferred Collection Type->Urine, Catheterized

## 2020-05-15 NOTE — SUBJECTIVE & OBJECTIVE
Interval History:     Review of Systems   Constitutional: Negative for activity change, appetite change, fatigue and fever.   HENT: Negative for congestion, ear discharge, mouth sores, nosebleeds, rhinorrhea, sinus pressure, sinus pain and tinnitus.    Eyes: Negative.  Negative for pain, redness and itching.   Respiratory: Negative for apnea, cough, choking, chest tightness, shortness of breath, wheezing and stridor.    Cardiovascular: Negative for chest pain, palpitations and leg swelling.   Gastrointestinal: Negative for abdominal distention, abdominal pain, anal bleeding, blood in stool, constipation and diarrhea.   Endocrine: Negative.    Genitourinary: Negative for difficulty urinating, flank pain, frequency and urgency.   Musculoskeletal: Negative for arthralgias, back pain, gait problem and myalgias.   Skin: Negative for color change and pallor.   Allergic/Immunologic: Negative.    Neurological: Negative for dizziness, facial asymmetry, weakness, light-headedness and headaches.   Hematological: Negative for adenopathy. Does not bruise/bleed easily.   Psychiatric/Behavioral: Positive for behavioral problems. The patient is nervous/anxious.      Objective:     Vital Signs (Most Recent):  Temp: 97.8 °F (36.6 °C) (05/15/20 0737)  Pulse: 64 (05/15/20 0737)  Resp: 18 (05/15/20 0737)  BP: (!) 148/70 (05/15/20 0737)  SpO2: (!) 94 % (05/15/20 0737) Vital Signs (24h Range):  Temp:  [96.9 °F (36.1 °C)-97.8 °F (36.6 °C)] 97.8 °F (36.6 °C)  Pulse:  [64-78] 64  Resp:  [16-18] 18  SpO2:  [94 %-96 %] 94 %  BP: (138-171)/(70-74) 148/70     Weight: 114.6 kg (252 lb 10.4 oz)  Body mass index is 36.25 kg/m².    Intake/Output Summary (Last 24 hours) at 5/15/2020 1216  Last data filed at 5/15/2020 0900  Gross per 24 hour   Intake 2190 ml   Output --   Net 2190 ml      Physical Exam   Constitutional: He is oriented to person, place, and time. He appears well-developed and well-nourished.   HENT:   Head: Normocephalic and  atraumatic.   Eyes: Pupils are equal, round, and reactive to light. EOM are normal.   Neck: Normal range of motion. Neck supple. No tracheal deviation present. No thyromegaly present.   Cardiovascular: Normal rate, regular rhythm, normal heart sounds and intact distal pulses.   Pulmonary/Chest: Effort normal and breath sounds normal.   Abdominal: Soft. Bowel sounds are normal. He exhibits no distension. There is tenderness. There is guarding. There is no rebound.   Musculoskeletal: Normal range of motion.   Lymphadenopathy:     He has no cervical adenopathy.   Neurological: He is alert and oriented to person, place, and time.   Skin: Skin is warm and dry. Capillary refill takes less than 2 seconds.   Psychiatric: He has a normal mood and affect. His behavior is normal. Judgment and thought content normal.   Nursing note and vitals reviewed.      Significant Labs:   Recent Lab Results       05/15/20  0757        Vancomycin-Trough 22.3         All pertinent labs within the past 24 hours have been reviewed.    Significant Imaging: I have reviewed and interpreted all pertinent imaging results/findings within the past 24 hours.

## 2020-05-15 NOTE — DISCHARGE SUMMARY
[facility] Hospital Medicine  Discharge Summary      Patient Name: Waldo Cedeno  MRN: [id number]  Admission Date: 5/7/2020  Hospital Length of Stay: 8 days  Discharge Date and Time:  05/15/2020 1:09 PM  Attending Physician: Moahmud Fregoso MD   Discharging Provider: Mohamud Fregoso MD  Primary Care Provider: Primary Doctor No      HPI:   History of Present Illness:  Patient is a 70 y.o. male who has no past medical history on file presented to ED after EMS was called to patients house for reported fall. Patient is currently confused and agitated. He is oriented to self only and angry about being in an unfamiliar environment. He is poor historian and unable to provide history. No family at bedside. Patient states he feels fine. Denies any pain. No evidence of trauma on patient. He lives alone and per reports he is not able to care for himself. Family unable to provide assistance. He was found to have UTI and will be admitted for IV abx with possible SNF vs NH placement after discharge.    * No surgery found *      Hospital Course:   05/08/2020:  Patient is a 70-year-old male that was admitted last evening with reportedly having fallen at home and is unable to care for himself.  Patient states he does not know why he is here he feels fine.  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  Vital signs showed blood pressure mildly elevated 163/67 pulse 73 respirations 18 temperature 99.5° the and O2 sat 93%.  Patient showed T-max of 101.1° overnight.  Labs show a white blood cell count of 14.3 hemoglobin 13.6 hematocrit 40 differential shows 77 granulocytes 11 lymphocytes sed rate greater than 90.  Chemistry shows sodium 135 potassium 3.2 chloride 101 bicarb 24 anion gap 10 BUN 16 creatinine 1.0 and GFR greater than 60  CT of the brain revealed cortical atrophy deep white matter changes mild-to-moderate hydrocephalus.  Chest x-ray  "revealed no acute chest disease.    5/9/2020  Patient stable. Denies any pain. Still with confusion but very talkative and alert. WBC has normalized. Labs are stable. Patient now awaiting placement. Will continue on IV antibiotics.    5/10/2020  Patient stable. No change in care plan.     05/11/2020:  Patient is continued to be stable labs were reviewed and unremarkable  White blood cell count normal hemoglobin 13.1 hematocrit 39  CMP completely normal  BP (!) 115/50 (BP Location: Left arm, Patient Position: Lying)   Pulse 79   Temp 96.4 °F (35.8 °C) (Axillary)   Resp 18   Ht 5' 10" (1.778 m)   Wt 114.6 kg (252 lb 10.4 oz)   SpO2 99%   BMI 36.25 kg/m²       05/12/2020:  BP (!) 142/89 (BP Location: Right arm, Patient Position: Lying)   Pulse 78   Temp 97 °F (36.1 °C) (Axillary)   Resp 17   Ht 5' 10" (1.778 m)   Wt 114.6 kg (252 lb 10.4 oz)   SpO2 96%   BMI 36.25 kg/m²     Blood cultures negative to date.  Comprehensive metabolic panel normal except for albumin 3.3  CBC normal white blood cell count 10.0 hemoglobin 13.7 hematocrit 40 and differential was normal    05/13/2020:  BP (!) 142/74 (BP Location: Right arm, Patient Position: Lying)   Pulse 72   Temp 97.8 °F (36.6 °C) (Oral)   Resp 18   Ht 5' 10" (1.778 m)   Wt 114.6 kg (252 lb 10.4 oz)   SpO2 95%   BMI 36.25 kg/m²     Vancomycin trough is 19.9 phosphorus and magnesium are normal.  Comprehensive metabolic panel normal  Patient is awaiting placement for rehab./or long-term placement.  Patient would benefit greatly from psychiatric evaluation and treatment    05/14/2020:  BP (!) 142/86 (BP Location: Right arm, Patient Position: Lying)   Pulse 84   Temp 98.1 °F (36.7 °C) (Oral)   Resp 18   Ht 5' 10" (1.778 m)   Wt 114.6 kg (252 lb 10.4 oz)   SpO2 96%   BMI 36.25 kg/m²     Chem profile completely normal liver ALT 53 albumin 3.4 and BUN 24 creatinine 0.8  Patient awaits placement for mental health issues.    05/15/2020:  Patient stable " with vital signs show blood pressure 1 pre 8/70 pulse 64 respirations 18 O2 sats 94-96%  Labs pending.  Patient is awaiting placement for long-term care.  This should happen on Monday 05/18/2020    05/15/2020:  Patient has been accepted by [facility]  Will transfer in stable condition and continue amlodipine 5 mg p.o. daily and Seroquel 25 mg b.i.d.  Cystitis has resolved and no other significant problems at discharge     Consults:   Consults (From admission, onward)        Status Ordering Provider     Inpatient consult to Case Management  Once     Provider:  (Not yet assigned)    Acknowledged MAURI URBINA     Pharmacy to dose Vancomycin consult  Once     Provider:  (Not yet assigned)    Acknowledged MAURI URBINA          * Septic encephalopathy  Secondary to above  Patient with worsening dementia   Baseline unclear. Will need to discuss with family.   CT head: pending  Cont neuro checks  Tele sitter  Fall precautions  Aspiration precautions  Delirium precautions:  Avoid antihistamines, anticholinergics, hypnotics, and minimize opiates while controlling for pain as these medications may exacerbate delirium. Cues for day/night will assist with keeping patient calm and oriented - during daytime, please keep adequate light in room (open windows, lights on) and please keep room dim at night-time to encourage normal sleep-wake cycles    5/9/2020  Continue above measures for delirium/confusion  Fall precautions  Continue IV antibiotics  Labs have normalized    5/10/2020  Patient with dementia, delirium  Stable but no change in mental status from prior exams    05/11/2020:  Dementia and delirium are continuing however mildly improved.  We await transfer for to long-term care facility.  Patient is not able to live alone.    05/12/2020  Dementia is stable  Awaiting transfer to long-term care facility.    05/15/2020:  Patient is accepted to [facility] in  [facility] patient will be transferred in stable condition today      Final Active Diagnoses:    Diagnosis Date Noted POA    PRINCIPAL PROBLEM:  Septic encephalopathy [G93.41] 05/07/2020 Yes    Bipolar 1 disorder [F31.9] 05/15/2020 Yes    Fall [W19.XXXA] 05/07/2020 Yes    Alcohol abuse [F10.10] 05/07/2020 Yes      Problems Resolved During this Admission:    Diagnosis Date Noted Date Resolved POA    Acute cystitis [N30.00] 05/07/2020 05/15/2020 Yes    Sepsis with metabolic encephalopathy [A41.9, R65.20, G93.41] 05/07/2020 05/07/2020 Yes       Discharged Condition: stable    Disposition:     Follow Up:    Patient Instructions:   No discharge procedures on file.    Significant Diagnostic Studies: Labs: All labs within the past 24 hours have been reviewed    Pending Diagnostic Studies:     None         Medications:  Reconciled Home Medications:      Medication List      START taking these medications    amLODIPine 5 MG tablet  Commonly known as:  NORVASC  Take 1 tablet (5 mg total) by mouth once daily.  Start taking on:  May 16, 2020     QUEtiapine 25 MG Tab  Commonly known as:  SEROQUEL  Take 1 tablet (25 mg total) by mouth 2 (two) times daily.            Indwelling Lines/Drains at time of discharge:   Lines/Drains/Airways     None                 Time spent on the discharge of patient: 35 minutes  Patient was seen and examined on the date of discharge and determined to be suitable for discharge.         Mohamud Fregoso MD  Department of Hospital Medicine  [facility]

## 2020-05-15 NOTE — NURSING
Pt spent considerable portion of night sitting at nurses station in wheelchair under close staff supervision after attempts to exit bed. Was fed chicken quesadilla and chips and salsa and thoroughly enjoyed listened to classic rock n' roll music, appeared to have extremely therapeutic and calming effect with patient singing along. Despite state of confusion, has maintained extremely pleasant demeanor throughout shift and has been joking and laughing with staff. Currently back in room asleep in bed, will continue to monitor.

## 2020-05-15 NOTE — PLAN OF CARE
[facility]    FACILITY TRANSFER ORDERS      Patient Name: Waldo Cedeno  YOB: 1950    PCP: Primary Doctor No   PCP Address: None  PCP Phone Number: None  PCP Fax: None    Encounter Date: 05/15/2020    Admit to: [facility] [place]    Vital Signs:  Routine    Diagnoses:   Active Hospital Problems    Diagnosis  POA    *Septic encephalopathy [G93.41]  Yes    Bipolar 1 disorder [F31.9]  Yes    Fall [W19.XXXA]  Yes    Alcohol abuse [F10.10]  Yes      Resolved Hospital Problems    Diagnosis Date Resolved POA    Acute cystitis [N30.00] 05/15/2020 Yes    Sepsis with metabolic encephalopathy [A41.9, R65.20, G93.41] 05/07/2020 Yes       Allergies:Review of patient's allergies indicates:  No Known Allergies    Diet: diabetic diet: 2000 calorie    Activities: Activity as tolerated    Nursing: routine     Labs: CBC and CMP Once     CONSULTS:    Physical Therapy to evaluate and treat.     MISCELLANEOUS CARE:  Routine Skin for Bedridden Patients: Apply moisture barrier cream to all skin folds and wet areas in perineal area daily and after baths and all bowel movements.    WOUND CARE ORDERS  None    Medications: Review discharge medications with patient and family and provide education.      Current Discharge Medication List      START taking these medications    Details   amLODIPine (NORVASC) 5 MG tablet Take 1 tablet (5 mg total) by mouth once daily.  Qty: 30 tablet, Refills: 11    Comments: .      QUEtiapine (SEROQUEL) 25 MG Tab Take 1 tablet (25 mg total) by mouth 2 (two) times daily.  Qty: 60 tablet, Refills: 11                  _________________________________  Mohamud Fregoso MD  05/15/2020

## 2020-05-15 NOTE — PLAN OF CARE
05/15/20 1241   Discharge Reassessment   Assessment Type Discharge Planning Reassessment   Anticipated Discharge Disposition Psych      SW received call back from Shira ([phone]) with [facility] who informed their provider was willing to re-evaluate for admission. Bed is available this day if accepted. RAMIRO faxed updated progress note, labs and med list to [phone]. Will follow for placement as appropriate.

## 2020-05-15 NOTE — PT/OT/SLP PROGRESS
Attempted to see patient for physical therapy treatment at 1720 - patient very lethargic, unable to arouse enough to participate in therapy session.  Will attempt treatment tomorrow.

## 2020-05-15 NOTE — DISCHARGE INSTRUCTIONS
Understanding Urinary Tract Infections (UTIs)  Most UTIs are caused by bacteria, although they may also be caused by viruses or fungi. Bacteria from the bowel are the most common source of infection. The infection may start because of any of the following:  · Sexual activity. During sex, bacteria can travel from the penis, vagina, or rectum into the urethra.   · Bacteria on the skin outside the rectum may travel into the urethra. This is more common in women since the rectum and urethra are closer to each other than in men. Wiping from front to back after using the toilet and keeping the area clean can help prevent germs from getting to the urethra.  · Blockage of urine flow through the urinary tract. If urine sits too long, germs may start to grow out of control.      Parts of the urinary tract  The infection can occur in any part of the urinary tract.  · The kidneys collect and store urine.  · The ureters carry urine from the kidneys to the bladder.  · The bladder holds urine until you are ready to let it out.  · The urethra carries urine from the bladder out of the body. It is shorter in women, so bacteria can move through it more easily. The urethra is longer in men, so a UTI is less likely to reach the bladder or kidneys in men.  Date Last Reviewed: 1/1/2017  © 8536-5911 [employer]. [address] [place] [address], [place] [address]. All rights reserved. This information is not intended as a substitute for professional medical care. Always follow your healthcare professional's instructions.

## 2020-05-15 NOTE — NURSING
Pt discharged to [facility].  Report called to Shira.  Pt is in no obvious distress, no complaints noted, no SOB.  Pt transferred to [facility] tyree w/o incident.

## 2020-05-15 NOTE — PLAN OF CARE
Problem: Fall Injury Risk  Goal: Absence of Fall and Fall-Related Injury  Outcome: Met     Problem: Adult Inpatient Plan of Care  Goal: Plan of Care Review  Outcome: Met  Goal: Patient-Specific Goal (Individualization)  Outcome: Met  Goal: Absence of Hospital-Acquired Illness or Injury  Outcome: Met  Goal: Optimal Comfort and Wellbeing  Outcome: Met  Goal: Readiness for Transition of Care  Outcome: Met  Goal: Rounds/Family Conference  Outcome: Met     Problem: Infection  Goal: Infection Symptom Resolution  Outcome: Met     Problem: Skin Injury Risk Increased  Goal: Skin Health and Integrity  Outcome: Met     Problem: UTI (Urinary Tract Infection)  Goal: Improved Infection Symptoms  Outcome: Met

## 2020-05-15 NOTE — PLAN OF CARE
Problem: Fall Injury Risk  Goal: Absence of Fall and Fall-Related Injury  Outcome: Ongoing, Progressing     Problem: Adult Inpatient Plan of Care  Goal: Plan of Care Review  Outcome: Ongoing, Progressing  Goal: Optimal Comfort and Wellbeing  Outcome: Ongoing, Progressing

## 2020-05-15 NOTE — ASSESSMENT & PLAN NOTE
Lab Results   Component Value Date    WBC 10.05 05/12/2020       No results found for this or any previous visit.  Results for orders placed or performed during the hospital encounter of 05/07/20   Urinalysis Microscopic   Result Value Ref Range    RBC, UA 15 (H) 0 - 4 /hpf    WBC, UA >100 (H) 0 - 5 /hpf    Bacteria Moderate (A) None-Occ /hpf    Hyaline Casts, UA 0 0-1/lpf /lpf    Microscopic Comment SEE COMMENT      Lactic acid 1.0  Urine and blood cultures pending  Cont IV Rocephin   hemodynamically stable    05/08/2020   Continue current antibiotics.  Change Rocephin to 1 g q.12 hours  Placement for patient on discharge.  Discussed with family further therapy options.    5/9/2020  Continue IV antibiotics  Awaiting placement on discharge  WBC wnl    5/10/2020  Patient stable, WBC wnl  Continue current therapy    05/11/2020  Patient is stable awaiting placement.  Cystitis is resolving  Repeat labs in the a.m. and patient is not accepted today    05/12/2020  Patient is stable can continuing to await placement.  Infection is resolving patient is at his baseline mental status.    05/13/2020:  Patient's cystitis is improving with antibiotic therapy.  Will continue antibiotics until placement  Patient awaiting placement  We will begin Seroquel 25 mg p.o. b.i.d. in place of diazepam.  Patient did have increasing behavioral issues on diazepam    05/14/2020  Cystitis is improving patient is nontoxic and stable.  Vital signs stable and labs today revealed no acute abnormalities.  GFR greater than 60  [facility]    05/15/2020:  Resolving cystitis.  Patient can stop IV antibiotics now  Will continue on p.o. antibiotics as needed.  Patient will be accepted to nursing home whenever bed available.

## 2020-05-15 NOTE — ASSESSMENT & PLAN NOTE
Secondary to above  Patient with worsening dementia   Baseline unclear. Will need to discuss with family.   CT head: pending  Cont neuro checks  Tele sitter  Fall precautions  Aspiration precautions  Delirium precautions:  Avoid antihistamines, anticholinergics, hypnotics, and minimize opiates while controlling for pain as these medications may exacerbate delirium. Cues for day/night will assist with keeping patient calm and oriented - during daytime, please keep adequate light in room (open windows, lights on) and please keep room dim at night-time to encourage normal sleep-wake cycles    5/9/2020  Continue above measures for delirium/confusion  Fall precautions  Continue IV antibiotics  Labs have normalized    5/10/2020  Patient with dementia, delirium  Stable but no change in mental status from prior exams    05/11/2020:  Dementia and delirium are continuing however mildly improved.  We await transfer for to long-term care facility.  Patient is not able to live alone.    05/12/2020  Dementia is stable  Awaiting transfer to long-term care facility.    05/15/2020:  Patient is accepted to [facility] in [facility] patient will be transferred in stable condition today

## 2020-05-18 NOTE — PLAN OF CARE
05/18/20 0832   Final Note   Assessment Type Final Discharge Note   Anticipated Discharge Disposition Psych   Hospital Follow Up  Appt(s) scheduled? No   Discharge plans and expectations educations in teach back method with documentation complete? Yes   Post-Acute Status   Post-Acute Authorization Placement   Post-Acute Placement Status Set-up Complete   Patient choice form signed by patient/caregiver List from System Post-Acute Care   Discharge Delays None known at this time     Pt accepted to [facility] on Friday, 05/15/20. SW contacted the pts brother (Murphy Thomas [phone]) to inform of acceptance and provide contact information for the facility. Pt was discharged and transported to [facility] via [facility]. This plan is complete with no other tasks noted for the LMSW.

## 2020-08-05 ENCOUNTER — LAB VISIT (OUTPATIENT)
Dept: LAB | Facility: OTHER | Age: 70
End: 2020-08-05
Payer: MEDICARE

## 2020-08-05 DIAGNOSIS — Z03.818 ENCOUNTER FOR OBSERVATION FOR SUSPECTED EXPOSURE TO OTHER BIOLOGICAL AGENTS RULED OUT: ICD-10-CM

## 2020-08-05 PROCEDURE — U0003 INFECTIOUS AGENT DETECTION BY NUCLEIC ACID (DNA OR RNA); SEVERE ACUTE RESPIRATORY SYNDROME CORONAVIRUS 2 (SARS-COV-2) (CORONAVIRUS DISEASE [COVID-19]), AMPLIFIED PROBE TECHNIQUE, MAKING USE OF HIGH THROUGHPUT TECHNOLOGIES AS DESCRIBED BY CMS-2020-01-R: HCPCS

## 2020-08-08 LAB — SARS-COV-2 RNA RESP QL NAA+PROBE: NORMAL

## 2020-09-21 ENCOUNTER — LAB VISIT (OUTPATIENT)
Dept: LAB | Facility: OTHER | Age: 70
End: 2020-09-21
Payer: MEDICARE

## 2020-09-21 DIAGNOSIS — Z03.818 ENCOUNTER FOR OBSERVATION FOR SUSPECTED EXPOSURE TO OTHER BIOLOGICAL AGENTS RULED OUT: ICD-10-CM

## 2020-09-21 PROCEDURE — U0003 INFECTIOUS AGENT DETECTION BY NUCLEIC ACID (DNA OR RNA); SEVERE ACUTE RESPIRATORY SYNDROME CORONAVIRUS 2 (SARS-COV-2) (CORONAVIRUS DISEASE [COVID-19]), AMPLIFIED PROBE TECHNIQUE, MAKING USE OF HIGH THROUGHPUT TECHNOLOGIES AS DESCRIBED BY CMS-2020-01-R: HCPCS

## 2020-09-22 LAB — SARS-COV-2 RNA RESP QL NAA+PROBE: NOT DETECTED

## 2020-09-24 ENCOUNTER — LAB VISIT (OUTPATIENT)
Dept: LAB | Facility: OTHER | Age: 70
End: 2020-09-24
Payer: MEDICARE

## 2020-09-24 DIAGNOSIS — Z03.818 ENCOUNTER FOR OBSERVATION FOR SUSPECTED EXPOSURE TO OTHER BIOLOGICAL AGENTS RULED OUT: ICD-10-CM

## 2020-09-24 PROCEDURE — U0003 INFECTIOUS AGENT DETECTION BY NUCLEIC ACID (DNA OR RNA); SEVERE ACUTE RESPIRATORY SYNDROME CORONAVIRUS 2 (SARS-COV-2) (CORONAVIRUS DISEASE [COVID-19]), AMPLIFIED PROBE TECHNIQUE, MAKING USE OF HIGH THROUGHPUT TECHNOLOGIES AS DESCRIBED BY CMS-2020-01-R: HCPCS

## 2020-09-25 LAB — SARS-COV-2 RNA RESP QL NAA+PROBE: NOT DETECTED

## 2020-09-28 ENCOUNTER — LAB VISIT (OUTPATIENT)
Dept: LAB | Facility: OTHER | Age: 70
End: 2020-09-28
Payer: MEDICARE

## 2020-09-28 DIAGNOSIS — Z03.818 ENCOUNTER FOR OBSERVATION FOR SUSPECTED EXPOSURE TO OTHER BIOLOGICAL AGENTS RULED OUT: ICD-10-CM

## 2020-09-28 PROCEDURE — U0003 INFECTIOUS AGENT DETECTION BY NUCLEIC ACID (DNA OR RNA); SEVERE ACUTE RESPIRATORY SYNDROME CORONAVIRUS 2 (SARS-COV-2) (CORONAVIRUS DISEASE [COVID-19]), AMPLIFIED PROBE TECHNIQUE, MAKING USE OF HIGH THROUGHPUT TECHNOLOGIES AS DESCRIBED BY CMS-2020-01-R: HCPCS

## 2020-10-01 ENCOUNTER — LAB VISIT (OUTPATIENT)
Dept: LAB | Facility: OTHER | Age: 70
End: 2020-10-01
Payer: MEDICARE

## 2020-10-01 DIAGNOSIS — Z03.818 ENCOUNTER FOR OBSERVATION FOR SUSPECTED EXPOSURE TO OTHER BIOLOGICAL AGENTS RULED OUT: ICD-10-CM

## 2020-10-01 LAB — SARS-COV-2 RNA RESP QL NAA+PROBE: NOT DETECTED

## 2020-10-01 PROCEDURE — U0003 INFECTIOUS AGENT DETECTION BY NUCLEIC ACID (DNA OR RNA); SEVERE ACUTE RESPIRATORY SYNDROME CORONAVIRUS 2 (SARS-COV-2) (CORONAVIRUS DISEASE [COVID-19]), AMPLIFIED PROBE TECHNIQUE, MAKING USE OF HIGH THROUGHPUT TECHNOLOGIES AS DESCRIBED BY CMS-2020-01-R: HCPCS

## 2020-10-02 LAB — SARS-COV-2 RNA RESP QL NAA+PROBE: NOT DETECTED

## 2020-10-07 ENCOUNTER — LAB VISIT (OUTPATIENT)
Dept: LAB | Facility: OTHER | Age: 70
End: 2020-10-07
Attending: INTERNAL MEDICINE
Payer: MEDICARE

## 2020-10-07 DIAGNOSIS — Z03.818 ENCOUNTER FOR OBSERVATION FOR SUSPECTED EXPOSURE TO OTHER BIOLOGICAL AGENTS RULED OUT: ICD-10-CM

## 2020-10-07 PROCEDURE — U0003 INFECTIOUS AGENT DETECTION BY NUCLEIC ACID (DNA OR RNA); SEVERE ACUTE RESPIRATORY SYNDROME CORONAVIRUS 2 (SARS-COV-2) (CORONAVIRUS DISEASE [COVID-19]), AMPLIFIED PROBE TECHNIQUE, MAKING USE OF HIGH THROUGHPUT TECHNOLOGIES AS DESCRIBED BY CMS-2020-01-R: HCPCS

## 2020-10-08 LAB — SARS-COV-2 RNA RESP QL NAA+PROBE: NOT DETECTED

## 2020-10-26 ENCOUNTER — LAB VISIT (OUTPATIENT)
Dept: LAB | Facility: OTHER | Age: 70
End: 2020-10-26
Payer: MEDICARE

## 2020-10-26 DIAGNOSIS — Z03.818 ENCOUNTER FOR OBSERVATION FOR SUSPECTED EXPOSURE TO OTHER BIOLOGICAL AGENTS RULED OUT: ICD-10-CM

## 2020-10-26 PROCEDURE — U0003 INFECTIOUS AGENT DETECTION BY NUCLEIC ACID (DNA OR RNA); SEVERE ACUTE RESPIRATORY SYNDROME CORONAVIRUS 2 (SARS-COV-2) (CORONAVIRUS DISEASE [COVID-19]), AMPLIFIED PROBE TECHNIQUE, MAKING USE OF HIGH THROUGHPUT TECHNOLOGIES AS DESCRIBED BY CMS-2020-01-R: HCPCS

## 2020-10-27 LAB — SARS-COV-2 RNA RESP QL NAA+PROBE: NOT DETECTED

## 2020-10-28 ENCOUNTER — LAB VISIT (OUTPATIENT)
Dept: LAB | Facility: OTHER | Age: 70
End: 2020-10-28
Payer: MEDICARE

## 2020-10-28 DIAGNOSIS — Z03.818 ENCOUNTER FOR OBSERVATION FOR SUSPECTED EXPOSURE TO OTHER BIOLOGICAL AGENTS RULED OUT: ICD-10-CM

## 2020-10-28 PROCEDURE — U0003 INFECTIOUS AGENT DETECTION BY NUCLEIC ACID (DNA OR RNA); SEVERE ACUTE RESPIRATORY SYNDROME CORONAVIRUS 2 (SARS-COV-2) (CORONAVIRUS DISEASE [COVID-19]), AMPLIFIED PROBE TECHNIQUE, MAKING USE OF HIGH THROUGHPUT TECHNOLOGIES AS DESCRIBED BY CMS-2020-01-R: HCPCS

## 2020-10-29 LAB — SARS-COV-2 RNA RESP QL NAA+PROBE: NOT DETECTED

## 2020-11-04 ENCOUNTER — LAB VISIT (OUTPATIENT)
Dept: LAB | Facility: OTHER | Age: 70
End: 2020-11-04
Payer: MEDICARE

## 2020-11-04 DIAGNOSIS — Z03.818 ENCOUNTER FOR OBSERVATION FOR SUSPECTED EXPOSURE TO OTHER BIOLOGICAL AGENTS RULED OUT: ICD-10-CM

## 2020-11-04 PROCEDURE — U0003 INFECTIOUS AGENT DETECTION BY NUCLEIC ACID (DNA OR RNA); SEVERE ACUTE RESPIRATORY SYNDROME CORONAVIRUS 2 (SARS-COV-2) (CORONAVIRUS DISEASE [COVID-19]), AMPLIFIED PROBE TECHNIQUE, MAKING USE OF HIGH THROUGHPUT TECHNOLOGIES AS DESCRIBED BY CMS-2020-01-R: HCPCS

## 2020-11-06 LAB — SARS-COV-2 RNA RESP QL NAA+PROBE: NOT DETECTED

## 2020-11-09 ENCOUNTER — LAB VISIT (OUTPATIENT)
Dept: LAB | Facility: OTHER | Age: 70
End: 2020-11-09
Payer: MEDICARE

## 2020-11-09 DIAGNOSIS — Z03.818 ENCOUNTER FOR OBSERVATION FOR SUSPECTED EXPOSURE TO OTHER BIOLOGICAL AGENTS RULED OUT: ICD-10-CM

## 2020-11-09 PROCEDURE — U0003 INFECTIOUS AGENT DETECTION BY NUCLEIC ACID (DNA OR RNA); SEVERE ACUTE RESPIRATORY SYNDROME CORONAVIRUS 2 (SARS-COV-2) (CORONAVIRUS DISEASE [COVID-19]), AMPLIFIED PROBE TECHNIQUE, MAKING USE OF HIGH THROUGHPUT TECHNOLOGIES AS DESCRIBED BY CMS-2020-01-R: HCPCS

## 2020-11-10 LAB — SARS-COV-2 RNA RESP QL NAA+PROBE: NOT DETECTED

## 2020-11-11 ENCOUNTER — LAB VISIT (OUTPATIENT)
Dept: LAB | Facility: OTHER | Age: 70
End: 2020-11-11
Payer: MEDICARE

## 2020-11-11 DIAGNOSIS — Z03.818 ENCOUNTER FOR OBSERVATION FOR SUSPECTED EXPOSURE TO OTHER BIOLOGICAL AGENTS RULED OUT: ICD-10-CM

## 2020-11-11 PROCEDURE — U0003 INFECTIOUS AGENT DETECTION BY NUCLEIC ACID (DNA OR RNA); SEVERE ACUTE RESPIRATORY SYNDROME CORONAVIRUS 2 (SARS-COV-2) (CORONAVIRUS DISEASE [COVID-19]), AMPLIFIED PROBE TECHNIQUE, MAKING USE OF HIGH THROUGHPUT TECHNOLOGIES AS DESCRIBED BY CMS-2020-01-R: HCPCS

## 2020-11-12 LAB — SARS-COV-2 RNA RESP QL NAA+PROBE: NOT DETECTED

## 2020-11-16 ENCOUNTER — LAB VISIT (OUTPATIENT)
Dept: LAB | Facility: OTHER | Age: 70
End: 2020-11-16
Payer: MEDICARE

## 2020-11-16 DIAGNOSIS — Z03.818 ENCOUNTER FOR OBSERVATION FOR SUSPECTED EXPOSURE TO OTHER BIOLOGICAL AGENTS RULED OUT: ICD-10-CM

## 2020-11-16 PROCEDURE — U0003 INFECTIOUS AGENT DETECTION BY NUCLEIC ACID (DNA OR RNA); SEVERE ACUTE RESPIRATORY SYNDROME CORONAVIRUS 2 (SARS-COV-2) (CORONAVIRUS DISEASE [COVID-19]), AMPLIFIED PROBE TECHNIQUE, MAKING USE OF HIGH THROUGHPUT TECHNOLOGIES AS DESCRIBED BY CMS-2020-01-R: HCPCS

## 2020-11-18 ENCOUNTER — LAB VISIT (OUTPATIENT)
Dept: LAB | Facility: OTHER | Age: 70
End: 2020-11-18
Payer: MEDICARE

## 2020-11-18 DIAGNOSIS — Z03.818 ENCOUNTER FOR OBSERVATION FOR SUSPECTED EXPOSURE TO OTHER BIOLOGICAL AGENTS RULED OUT: ICD-10-CM

## 2020-11-18 LAB — SARS-COV-2 RNA RESP QL NAA+PROBE: NOT DETECTED

## 2020-11-18 PROCEDURE — U0003 INFECTIOUS AGENT DETECTION BY NUCLEIC ACID (DNA OR RNA); SEVERE ACUTE RESPIRATORY SYNDROME CORONAVIRUS 2 (SARS-COV-2) (CORONAVIRUS DISEASE [COVID-19]), AMPLIFIED PROBE TECHNIQUE, MAKING USE OF HIGH THROUGHPUT TECHNOLOGIES AS DESCRIBED BY CMS-2020-01-R: HCPCS

## 2020-11-23 LAB — SARS-COV-2 RNA RESP QL NAA+PROBE: NOT DETECTED

## 2020-11-25 ENCOUNTER — LAB VISIT (OUTPATIENT)
Dept: LAB | Facility: OTHER | Age: 70
End: 2020-11-25
Payer: MEDICARE

## 2020-11-25 DIAGNOSIS — Z03.818 ENCOUNTER FOR OBSERVATION FOR SUSPECTED EXPOSURE TO OTHER BIOLOGICAL AGENTS RULED OUT: ICD-10-CM

## 2020-11-25 PROCEDURE — U0003 INFECTIOUS AGENT DETECTION BY NUCLEIC ACID (DNA OR RNA); SEVERE ACUTE RESPIRATORY SYNDROME CORONAVIRUS 2 (SARS-COV-2) (CORONAVIRUS DISEASE [COVID-19]), AMPLIFIED PROBE TECHNIQUE, MAKING USE OF HIGH THROUGHPUT TECHNOLOGIES AS DESCRIBED BY CMS-2020-01-R: HCPCS

## 2020-11-27 LAB — SARS-COV-2 RNA RESP QL NAA+PROBE: NOT DETECTED

## 2020-11-30 ENCOUNTER — LAB VISIT (OUTPATIENT)
Dept: LAB | Facility: OTHER | Age: 70
End: 2020-11-30
Payer: MEDICARE

## 2020-11-30 DIAGNOSIS — Z03.818 ENCOUNTER FOR OBSERVATION FOR SUSPECTED EXPOSURE TO OTHER BIOLOGICAL AGENTS RULED OUT: ICD-10-CM

## 2020-11-30 PROCEDURE — U0003 INFECTIOUS AGENT DETECTION BY NUCLEIC ACID (DNA OR RNA); SEVERE ACUTE RESPIRATORY SYNDROME CORONAVIRUS 2 (SARS-COV-2) (CORONAVIRUS DISEASE [COVID-19]), AMPLIFIED PROBE TECHNIQUE, MAKING USE OF HIGH THROUGHPUT TECHNOLOGIES AS DESCRIBED BY CMS-2020-01-R: HCPCS

## 2020-12-02 LAB — SARS-COV-2 RNA RESP QL NAA+PROBE: NOT DETECTED

## 2020-12-07 ENCOUNTER — LAB VISIT (OUTPATIENT)
Dept: LAB | Facility: OTHER | Age: 70
End: 2020-12-07
Payer: MEDICARE

## 2020-12-07 DIAGNOSIS — Z03.818 ENCOUNTER FOR OBSERVATION FOR SUSPECTED EXPOSURE TO OTHER BIOLOGICAL AGENTS RULED OUT: ICD-10-CM

## 2020-12-07 PROCEDURE — U0003 INFECTIOUS AGENT DETECTION BY NUCLEIC ACID (DNA OR RNA); SEVERE ACUTE RESPIRATORY SYNDROME CORONAVIRUS 2 (SARS-COV-2) (CORONAVIRUS DISEASE [COVID-19]), AMPLIFIED PROBE TECHNIQUE, MAKING USE OF HIGH THROUGHPUT TECHNOLOGIES AS DESCRIBED BY CMS-2020-01-R: HCPCS

## 2020-12-09 ENCOUNTER — LAB VISIT (OUTPATIENT)
Dept: LAB | Facility: OTHER | Age: 70
End: 2020-12-09
Payer: MEDICARE

## 2020-12-09 DIAGNOSIS — Z03.818 ENCOUNTER FOR OBSERVATION FOR SUSPECTED EXPOSURE TO OTHER BIOLOGICAL AGENTS RULED OUT: ICD-10-CM

## 2020-12-09 LAB — SARS-COV-2 RNA RESP QL NAA+PROBE: NOT DETECTED

## 2020-12-09 PROCEDURE — U0003 INFECTIOUS AGENT DETECTION BY NUCLEIC ACID (DNA OR RNA); SEVERE ACUTE RESPIRATORY SYNDROME CORONAVIRUS 2 (SARS-COV-2) (CORONAVIRUS DISEASE [COVID-19]), AMPLIFIED PROBE TECHNIQUE, MAKING USE OF HIGH THROUGHPUT TECHNOLOGIES AS DESCRIBED BY CMS-2020-01-R: HCPCS

## 2020-12-11 LAB — SARS-COV-2 RNA RESP QL NAA+PROBE: NOT DETECTED

## 2020-12-30 ENCOUNTER — LAB VISIT (OUTPATIENT)
Dept: LAB | Facility: OTHER | Age: 70
End: 2020-12-30
Payer: MEDICARE

## 2020-12-30 DIAGNOSIS — Z03.818 ENCOUNTER FOR OBSERVATION FOR SUSPECTED EXPOSURE TO OTHER BIOLOGICAL AGENTS RULED OUT: ICD-10-CM

## 2020-12-30 PROCEDURE — U0003 INFECTIOUS AGENT DETECTION BY NUCLEIC ACID (DNA OR RNA); SEVERE ACUTE RESPIRATORY SYNDROME CORONAVIRUS 2 (SARS-COV-2) (CORONAVIRUS DISEASE [COVID-19]), AMPLIFIED PROBE TECHNIQUE, MAKING USE OF HIGH THROUGHPUT TECHNOLOGIES AS DESCRIBED BY CMS-2020-01-R: HCPCS

## 2020-12-31 LAB — SARS-COV-2 RNA RESP QL NAA+PROBE: NOT DETECTED

## 2021-01-06 ENCOUNTER — LAB VISIT (OUTPATIENT)
Dept: LAB | Facility: OTHER | Age: 71
End: 2021-01-06
Payer: MEDICARE

## 2021-01-06 DIAGNOSIS — Z03.818 ENCOUNTER FOR OBSERVATION FOR SUSPECTED EXPOSURE TO OTHER BIOLOGICAL AGENTS RULED OUT: ICD-10-CM

## 2021-01-06 PROCEDURE — U0003 INFECTIOUS AGENT DETECTION BY NUCLEIC ACID (DNA OR RNA); SEVERE ACUTE RESPIRATORY SYNDROME CORONAVIRUS 2 (SARS-COV-2) (CORONAVIRUS DISEASE [COVID-19]), AMPLIFIED PROBE TECHNIQUE, MAKING USE OF HIGH THROUGHPUT TECHNOLOGIES AS DESCRIBED BY CMS-2020-01-R: HCPCS

## 2021-01-08 LAB — SARS-COV-2 RNA RESP QL NAA+PROBE: NOT DETECTED

## 2021-01-20 ENCOUNTER — LAB VISIT (OUTPATIENT)
Dept: LAB | Facility: OTHER | Age: 71
End: 2021-01-20
Payer: MEDICARE

## 2021-01-20 DIAGNOSIS — Z20.822 ENCOUNTER FOR LABORATORY TESTING FOR COVID-19 VIRUS: ICD-10-CM

## 2021-01-20 PROCEDURE — U0003 INFECTIOUS AGENT DETECTION BY NUCLEIC ACID (DNA OR RNA); SEVERE ACUTE RESPIRATORY SYNDROME CORONAVIRUS 2 (SARS-COV-2) (CORONAVIRUS DISEASE [COVID-19]), AMPLIFIED PROBE TECHNIQUE, MAKING USE OF HIGH THROUGHPUT TECHNOLOGIES AS DESCRIBED BY CMS-2020-01-R: HCPCS

## 2021-01-22 LAB — SARS-COV-2 RNA RESP QL NAA+PROBE: NOT DETECTED

## 2021-01-27 ENCOUNTER — LAB VISIT (OUTPATIENT)
Dept: LAB | Facility: OTHER | Age: 71
End: 2021-01-27
Payer: MEDICARE

## 2021-01-27 DIAGNOSIS — Z20.822 ENCOUNTER FOR LABORATORY TESTING FOR COVID-19 VIRUS: ICD-10-CM

## 2021-01-27 PROCEDURE — U0003 INFECTIOUS AGENT DETECTION BY NUCLEIC ACID (DNA OR RNA); SEVERE ACUTE RESPIRATORY SYNDROME CORONAVIRUS 2 (SARS-COV-2) (CORONAVIRUS DISEASE [COVID-19]), AMPLIFIED PROBE TECHNIQUE, MAKING USE OF HIGH THROUGHPUT TECHNOLOGIES AS DESCRIBED BY CMS-2020-01-R: HCPCS

## 2021-01-28 LAB — SARS-COV-2 RNA RESP QL NAA+PROBE: NOT DETECTED

## 2021-02-10 ENCOUNTER — LAB VISIT (OUTPATIENT)
Dept: LAB | Facility: OTHER | Age: 71
End: 2021-02-10
Payer: MEDICARE

## 2021-02-10 DIAGNOSIS — Z20.822 ENCOUNTER FOR LABORATORY TESTING FOR COVID-19 VIRUS: ICD-10-CM

## 2021-02-10 PROCEDURE — U0003 INFECTIOUS AGENT DETECTION BY NUCLEIC ACID (DNA OR RNA); SEVERE ACUTE RESPIRATORY SYNDROME CORONAVIRUS 2 (SARS-COV-2) (CORONAVIRUS DISEASE [COVID-19]), AMPLIFIED PROBE TECHNIQUE, MAKING USE OF HIGH THROUGHPUT TECHNOLOGIES AS DESCRIBED BY CMS-2020-01-R: HCPCS

## 2021-02-11 LAB — SARS-COV-2 RNA RESP QL NAA+PROBE: NOT DETECTED

## 2021-02-17 ENCOUNTER — LAB VISIT (OUTPATIENT)
Dept: LAB | Facility: OTHER | Age: 71
End: 2021-02-17
Payer: MEDICARE

## 2021-02-17 DIAGNOSIS — Z20.822 ENCOUNTER FOR LABORATORY TESTING FOR COVID-19 VIRUS: ICD-10-CM

## 2021-02-17 PROCEDURE — U0003 INFECTIOUS AGENT DETECTION BY NUCLEIC ACID (DNA OR RNA); SEVERE ACUTE RESPIRATORY SYNDROME CORONAVIRUS 2 (SARS-COV-2) (CORONAVIRUS DISEASE [COVID-19]), AMPLIFIED PROBE TECHNIQUE, MAKING USE OF HIGH THROUGHPUT TECHNOLOGIES AS DESCRIBED BY CMS-2020-01-R: HCPCS

## 2021-02-19 LAB — SARS-COV-2 RNA RESP QL NAA+PROBE: NOT DETECTED

## 2021-02-24 ENCOUNTER — LAB VISIT (OUTPATIENT)
Dept: LAB | Facility: OTHER | Age: 71
End: 2021-02-24
Payer: MEDICARE

## 2021-02-24 DIAGNOSIS — Z20.822 ENCOUNTER FOR LABORATORY TESTING FOR COVID-19 VIRUS: ICD-10-CM

## 2021-02-24 PROCEDURE — U0003 INFECTIOUS AGENT DETECTION BY NUCLEIC ACID (DNA OR RNA); SEVERE ACUTE RESPIRATORY SYNDROME CORONAVIRUS 2 (SARS-COV-2) (CORONAVIRUS DISEASE [COVID-19]), AMPLIFIED PROBE TECHNIQUE, MAKING USE OF HIGH THROUGHPUT TECHNOLOGIES AS DESCRIBED BY CMS-2020-01-R: HCPCS

## 2021-02-25 LAB — SARS-COV-2 RNA RESP QL NAA+PROBE: NOT DETECTED

## 2021-03-03 ENCOUNTER — LAB VISIT (OUTPATIENT)
Dept: LAB | Facility: OTHER | Age: 71
End: 2021-03-03
Payer: MEDICARE

## 2021-03-03 DIAGNOSIS — Z20.822 ENCOUNTER FOR LABORATORY TESTING FOR COVID-19 VIRUS: ICD-10-CM

## 2021-03-03 PROCEDURE — U0003 INFECTIOUS AGENT DETECTION BY NUCLEIC ACID (DNA OR RNA); SEVERE ACUTE RESPIRATORY SYNDROME CORONAVIRUS 2 (SARS-COV-2) (CORONAVIRUS DISEASE [COVID-19]), AMPLIFIED PROBE TECHNIQUE, MAKING USE OF HIGH THROUGHPUT TECHNOLOGIES AS DESCRIBED BY CMS-2020-01-R: HCPCS | Performed by: NURSE PRACTITIONER

## 2021-03-04 LAB — SARS-COV-2 RNA RESP QL NAA+PROBE: NOT DETECTED

## 2021-03-10 ENCOUNTER — LAB VISIT (OUTPATIENT)
Dept: LAB | Facility: OTHER | Age: 71
End: 2021-03-10
Payer: MEDICARE

## 2021-03-10 DIAGNOSIS — Z20.822 ENCOUNTER FOR LABORATORY TESTING FOR COVID-19 VIRUS: ICD-10-CM

## 2021-03-10 PROCEDURE — U0003 INFECTIOUS AGENT DETECTION BY NUCLEIC ACID (DNA OR RNA); SEVERE ACUTE RESPIRATORY SYNDROME CORONAVIRUS 2 (SARS-COV-2) (CORONAVIRUS DISEASE [COVID-19]), AMPLIFIED PROBE TECHNIQUE, MAKING USE OF HIGH THROUGHPUT TECHNOLOGIES AS DESCRIBED BY CMS-2020-01-R: HCPCS | Performed by: NURSE PRACTITIONER

## 2021-03-11 LAB — SARS-COV-2 RNA RESP QL NAA+PROBE: NOT DETECTED

## 2021-03-17 ENCOUNTER — LAB VISIT (OUTPATIENT)
Dept: LAB | Facility: OTHER | Age: 71
End: 2021-03-17
Payer: MEDICARE

## 2021-03-17 DIAGNOSIS — Z20.822 ENCOUNTER FOR LABORATORY TESTING FOR COVID-19 VIRUS: ICD-10-CM

## 2021-03-17 PROCEDURE — U0003 INFECTIOUS AGENT DETECTION BY NUCLEIC ACID (DNA OR RNA); SEVERE ACUTE RESPIRATORY SYNDROME CORONAVIRUS 2 (SARS-COV-2) (CORONAVIRUS DISEASE [COVID-19]), AMPLIFIED PROBE TECHNIQUE, MAKING USE OF HIGH THROUGHPUT TECHNOLOGIES AS DESCRIBED BY CMS-2020-01-R: HCPCS | Performed by: NURSE PRACTITIONER

## 2021-03-18 LAB — SARS-COV-2 RNA RESP QL NAA+PROBE: NOT DETECTED

## 2021-03-24 ENCOUNTER — LAB VISIT (OUTPATIENT)
Dept: LAB | Facility: OTHER | Age: 71
End: 2021-03-24
Payer: MEDICARE

## 2021-03-24 DIAGNOSIS — Z20.822 ENCOUNTER FOR LABORATORY TESTING FOR COVID-19 VIRUS: ICD-10-CM

## 2021-03-24 PROCEDURE — U0003 INFECTIOUS AGENT DETECTION BY NUCLEIC ACID (DNA OR RNA); SEVERE ACUTE RESPIRATORY SYNDROME CORONAVIRUS 2 (SARS-COV-2) (CORONAVIRUS DISEASE [COVID-19]), AMPLIFIED PROBE TECHNIQUE, MAKING USE OF HIGH THROUGHPUT TECHNOLOGIES AS DESCRIBED BY CMS-2020-01-R: HCPCS | Performed by: NURSE PRACTITIONER

## 2021-03-25 LAB — SARS-COV-2 RNA RESP QL NAA+PROBE: NOT DETECTED

## 2021-03-31 ENCOUNTER — LAB VISIT (OUTPATIENT)
Dept: LAB | Facility: OTHER | Age: 71
End: 2021-03-31
Payer: MEDICARE

## 2021-03-31 DIAGNOSIS — Z20.822 ENCOUNTER FOR LABORATORY TESTING FOR COVID-19 VIRUS: ICD-10-CM

## 2021-03-31 PROCEDURE — U0003 INFECTIOUS AGENT DETECTION BY NUCLEIC ACID (DNA OR RNA); SEVERE ACUTE RESPIRATORY SYNDROME CORONAVIRUS 2 (SARS-COV-2) (CORONAVIRUS DISEASE [COVID-19]), AMPLIFIED PROBE TECHNIQUE, MAKING USE OF HIGH THROUGHPUT TECHNOLOGIES AS DESCRIBED BY CMS-2020-01-R: HCPCS | Performed by: NURSE PRACTITIONER

## 2021-04-01 LAB — SARS-COV-2 RNA RESP QL NAA+PROBE: NOT DETECTED

## 2021-04-07 ENCOUNTER — LAB VISIT (OUTPATIENT)
Dept: LAB | Facility: OTHER | Age: 71
End: 2021-04-07
Payer: MEDICARE

## 2021-04-07 DIAGNOSIS — Z20.822 ENCOUNTER FOR LABORATORY TESTING FOR COVID-19 VIRUS: ICD-10-CM

## 2021-04-07 PROCEDURE — U0003 INFECTIOUS AGENT DETECTION BY NUCLEIC ACID (DNA OR RNA); SEVERE ACUTE RESPIRATORY SYNDROME CORONAVIRUS 2 (SARS-COV-2) (CORONAVIRUS DISEASE [COVID-19]), AMPLIFIED PROBE TECHNIQUE, MAKING USE OF HIGH THROUGHPUT TECHNOLOGIES AS DESCRIBED BY CMS-2020-01-R: HCPCS | Performed by: NURSE PRACTITIONER

## 2021-04-08 LAB — SARS-COV-2 RNA RESP QL NAA+PROBE: NOT DETECTED

## 2021-04-14 ENCOUNTER — LAB VISIT (OUTPATIENT)
Dept: LAB | Facility: OTHER | Age: 71
End: 2021-04-14
Payer: MEDICARE

## 2021-04-14 DIAGNOSIS — Z20.822 ENCOUNTER FOR LABORATORY TESTING FOR COVID-19 VIRUS: ICD-10-CM

## 2021-04-14 PROCEDURE — U0003 INFECTIOUS AGENT DETECTION BY NUCLEIC ACID (DNA OR RNA); SEVERE ACUTE RESPIRATORY SYNDROME CORONAVIRUS 2 (SARS-COV-2) (CORONAVIRUS DISEASE [COVID-19]), AMPLIFIED PROBE TECHNIQUE, MAKING USE OF HIGH THROUGHPUT TECHNOLOGIES AS DESCRIBED BY CMS-2020-01-R: HCPCS | Performed by: NURSE PRACTITIONER

## 2021-04-15 LAB — SARS-COV-2 RNA RESP QL NAA+PROBE: NOT DETECTED

## 2021-04-28 ENCOUNTER — LAB VISIT (OUTPATIENT)
Dept: LAB | Facility: OTHER | Age: 71
End: 2021-04-28
Payer: MEDICARE

## 2021-04-28 DIAGNOSIS — Z20.822 ENCOUNTER FOR LABORATORY TESTING FOR COVID-19 VIRUS: ICD-10-CM

## 2021-04-28 PROCEDURE — U0003 INFECTIOUS AGENT DETECTION BY NUCLEIC ACID (DNA OR RNA); SEVERE ACUTE RESPIRATORY SYNDROME CORONAVIRUS 2 (SARS-COV-2) (CORONAVIRUS DISEASE [COVID-19]), AMPLIFIED PROBE TECHNIQUE, MAKING USE OF HIGH THROUGHPUT TECHNOLOGIES AS DESCRIBED BY CMS-2020-01-R: HCPCS | Performed by: NURSE PRACTITIONER

## 2021-04-29 LAB — SARS-COV-2 RNA RESP QL NAA+PROBE: NOT DETECTED

## 2021-05-19 ENCOUNTER — LAB VISIT (OUTPATIENT)
Dept: LAB | Facility: OTHER | Age: 71
End: 2021-05-19
Payer: MEDICARE

## 2021-05-19 DIAGNOSIS — Z20.822 ENCOUNTER FOR LABORATORY TESTING FOR COVID-19 VIRUS: ICD-10-CM

## 2021-05-19 PROCEDURE — U0003 INFECTIOUS AGENT DETECTION BY NUCLEIC ACID (DNA OR RNA); SEVERE ACUTE RESPIRATORY SYNDROME CORONAVIRUS 2 (SARS-COV-2) (CORONAVIRUS DISEASE [COVID-19]), AMPLIFIED PROBE TECHNIQUE, MAKING USE OF HIGH THROUGHPUT TECHNOLOGIES AS DESCRIBED BY CMS-2020-01-R: HCPCS | Performed by: NURSE PRACTITIONER

## 2021-05-20 LAB — SARS-COV-2 RNA RESP QL NAA+PROBE: NOT DETECTED

## 2021-06-09 ENCOUNTER — LAB VISIT (OUTPATIENT)
Dept: LAB | Facility: OTHER | Age: 71
End: 2021-06-09
Payer: MEDICARE

## 2021-06-09 DIAGNOSIS — Z20.822 ENCOUNTER FOR LABORATORY TESTING FOR COVID-19 VIRUS: ICD-10-CM

## 2021-06-09 PROCEDURE — U0003 INFECTIOUS AGENT DETECTION BY NUCLEIC ACID (DNA OR RNA); SEVERE ACUTE RESPIRATORY SYNDROME CORONAVIRUS 2 (SARS-COV-2) (CORONAVIRUS DISEASE [COVID-19]), AMPLIFIED PROBE TECHNIQUE, MAKING USE OF HIGH THROUGHPUT TECHNOLOGIES AS DESCRIBED BY CMS-2020-01-R: HCPCS | Performed by: NURSE PRACTITIONER

## 2021-06-10 LAB — SARS-COV-2 RNA RESP QL NAA+PROBE: NOT DETECTED

## 2021-06-16 ENCOUNTER — LAB VISIT (OUTPATIENT)
Dept: LAB | Facility: OTHER | Age: 71
End: 2021-06-16
Payer: MEDICARE

## 2021-06-16 DIAGNOSIS — Z20.822 ENCOUNTER FOR LABORATORY TESTING FOR COVID-19 VIRUS: ICD-10-CM

## 2021-06-16 PROCEDURE — U0003 INFECTIOUS AGENT DETECTION BY NUCLEIC ACID (DNA OR RNA); SEVERE ACUTE RESPIRATORY SYNDROME CORONAVIRUS 2 (SARS-COV-2) (CORONAVIRUS DISEASE [COVID-19]), AMPLIFIED PROBE TECHNIQUE, MAKING USE OF HIGH THROUGHPUT TECHNOLOGIES AS DESCRIBED BY CMS-2020-01-R: HCPCS | Performed by: NURSE PRACTITIONER

## 2021-06-17 LAB — SARS-COV-2 RNA RESP QL NAA+PROBE: NOT DETECTED

## 2021-06-23 ENCOUNTER — LAB VISIT (OUTPATIENT)
Dept: LAB | Facility: OTHER | Age: 71
End: 2021-06-23
Payer: MEDICARE

## 2021-06-23 DIAGNOSIS — Z20.822 ENCOUNTER FOR LABORATORY TESTING FOR COVID-19 VIRUS: ICD-10-CM

## 2021-06-23 PROCEDURE — U0003 INFECTIOUS AGENT DETECTION BY NUCLEIC ACID (DNA OR RNA); SEVERE ACUTE RESPIRATORY SYNDROME CORONAVIRUS 2 (SARS-COV-2) (CORONAVIRUS DISEASE [COVID-19]), AMPLIFIED PROBE TECHNIQUE, MAKING USE OF HIGH THROUGHPUT TECHNOLOGIES AS DESCRIBED BY CMS-2020-01-R: HCPCS | Performed by: NURSE PRACTITIONER

## 2021-06-24 LAB — SARS-COV-2 RNA RESP QL NAA+PROBE: NOT DETECTED

## 2021-07-01 ENCOUNTER — PATIENT MESSAGE (OUTPATIENT)
Dept: ADMINISTRATIVE | Facility: OTHER | Age: 71
End: 2021-07-01

## 2021-07-07 ENCOUNTER — LAB VISIT (OUTPATIENT)
Dept: LAB | Facility: OTHER | Age: 71
End: 2021-07-07
Payer: MEDICARE

## 2021-07-07 DIAGNOSIS — Z20.822 ENCOUNTER FOR LABORATORY TESTING FOR COVID-19 VIRUS: ICD-10-CM

## 2021-07-07 PROCEDURE — U0003 INFECTIOUS AGENT DETECTION BY NUCLEIC ACID (DNA OR RNA); SEVERE ACUTE RESPIRATORY SYNDROME CORONAVIRUS 2 (SARS-COV-2) (CORONAVIRUS DISEASE [COVID-19]), AMPLIFIED PROBE TECHNIQUE, MAKING USE OF HIGH THROUGHPUT TECHNOLOGIES AS DESCRIBED BY CMS-2020-01-R: HCPCS | Performed by: NURSE PRACTITIONER

## 2021-07-08 LAB
SARS-COV-2 RNA RESP QL NAA+PROBE: NOT DETECTED
SARS-COV-2- CYCLE NUMBER: -1

## 2021-07-21 ENCOUNTER — LAB VISIT (OUTPATIENT)
Dept: LAB | Facility: OTHER | Age: 71
End: 2021-07-21
Payer: MEDICARE

## 2021-07-21 DIAGNOSIS — Z20.822 ENCOUNTER FOR LABORATORY TESTING FOR COVID-19 VIRUS: ICD-10-CM

## 2021-07-21 PROCEDURE — U0003 INFECTIOUS AGENT DETECTION BY NUCLEIC ACID (DNA OR RNA); SEVERE ACUTE RESPIRATORY SYNDROME CORONAVIRUS 2 (SARS-COV-2) (CORONAVIRUS DISEASE [COVID-19]), AMPLIFIED PROBE TECHNIQUE, MAKING USE OF HIGH THROUGHPUT TECHNOLOGIES AS DESCRIBED BY CMS-2020-01-R: HCPCS | Performed by: NURSE PRACTITIONER

## 2021-07-23 LAB
SARS-COV-2 RNA RESP QL NAA+PROBE: NOT DETECTED
SARS-COV-2- CYCLE NUMBER: -1

## 2021-07-28 ENCOUNTER — LAB VISIT (OUTPATIENT)
Dept: LAB | Facility: OTHER | Age: 71
End: 2021-07-28
Payer: MEDICARE

## 2021-07-28 DIAGNOSIS — Z20.822 ENCOUNTER FOR LABORATORY TESTING FOR COVID-19 VIRUS: ICD-10-CM

## 2021-07-28 PROCEDURE — U0003 INFECTIOUS AGENT DETECTION BY NUCLEIC ACID (DNA OR RNA); SEVERE ACUTE RESPIRATORY SYNDROME CORONAVIRUS 2 (SARS-COV-2) (CORONAVIRUS DISEASE [COVID-19]), AMPLIFIED PROBE TECHNIQUE, MAKING USE OF HIGH THROUGHPUT TECHNOLOGIES AS DESCRIBED BY CMS-2020-01-R: HCPCS | Performed by: NURSE PRACTITIONER

## 2021-07-29 LAB
SARS-COV-2 RNA RESP QL NAA+PROBE: NOT DETECTED
SARS-COV-2- CYCLE NUMBER: -1

## 2021-08-04 ENCOUNTER — LAB VISIT (OUTPATIENT)
Dept: LAB | Facility: OTHER | Age: 71
End: 2021-08-04
Payer: MEDICARE

## 2021-08-04 DIAGNOSIS — Z20.822 ENCOUNTER FOR LABORATORY TESTING FOR COVID-19 VIRUS: ICD-10-CM

## 2021-08-04 PROCEDURE — U0003 INFECTIOUS AGENT DETECTION BY NUCLEIC ACID (DNA OR RNA); SEVERE ACUTE RESPIRATORY SYNDROME CORONAVIRUS 2 (SARS-COV-2) (CORONAVIRUS DISEASE [COVID-19]), AMPLIFIED PROBE TECHNIQUE, MAKING USE OF HIGH THROUGHPUT TECHNOLOGIES AS DESCRIBED BY CMS-2020-01-R: HCPCS | Performed by: NURSE PRACTITIONER

## 2021-08-07 LAB
SARS-COV-2 RNA RESP QL NAA+PROBE: NORMAL
TEST PERFORMANCE INFO SPEC: NORMAL

## 2021-08-11 ENCOUNTER — LAB VISIT (OUTPATIENT)
Dept: LAB | Facility: OTHER | Age: 71
End: 2021-08-11
Payer: MEDICARE

## 2021-08-11 DIAGNOSIS — Z20.822 ENCOUNTER FOR LABORATORY TESTING FOR COVID-19 VIRUS: ICD-10-CM

## 2021-08-11 PROCEDURE — U0003 INFECTIOUS AGENT DETECTION BY NUCLEIC ACID (DNA OR RNA); SEVERE ACUTE RESPIRATORY SYNDROME CORONAVIRUS 2 (SARS-COV-2) (CORONAVIRUS DISEASE [COVID-19]), AMPLIFIED PROBE TECHNIQUE, MAKING USE OF HIGH THROUGHPUT TECHNOLOGIES AS DESCRIBED BY CMS-2020-01-R: HCPCS | Performed by: NURSE PRACTITIONER

## 2021-08-12 LAB
SARS-COV-2 RNA RESP QL NAA+PROBE: NOT DETECTED
SARS-COV-2- CYCLE NUMBER: -1

## 2021-08-18 ENCOUNTER — LAB VISIT (OUTPATIENT)
Dept: LAB | Facility: OTHER | Age: 71
End: 2021-08-18
Payer: MEDICARE

## 2021-08-18 DIAGNOSIS — Z20.822 ENCOUNTER FOR LABORATORY TESTING FOR COVID-19 VIRUS: ICD-10-CM

## 2021-08-18 PROCEDURE — U0003 INFECTIOUS AGENT DETECTION BY NUCLEIC ACID (DNA OR RNA); SEVERE ACUTE RESPIRATORY SYNDROME CORONAVIRUS 2 (SARS-COV-2) (CORONAVIRUS DISEASE [COVID-19]), AMPLIFIED PROBE TECHNIQUE, MAKING USE OF HIGH THROUGHPUT TECHNOLOGIES AS DESCRIBED BY CMS-2020-01-R: HCPCS | Performed by: NURSE PRACTITIONER

## 2021-08-20 LAB
SARS-COV-2 RNA RESP QL NAA+PROBE: NORMAL
TEST PERFORMANCE INFO SPEC: NORMAL

## 2022-03-28 ENCOUNTER — LAB VISIT (OUTPATIENT)
Dept: LAB | Facility: OTHER | Age: 72
End: 2022-03-28
Payer: MEDICARE

## 2022-03-28 DIAGNOSIS — Z20.822 ENCOUNTER FOR LABORATORY TESTING FOR COVID-19 VIRUS: ICD-10-CM

## 2022-03-28 PROCEDURE — U0003 INFECTIOUS AGENT DETECTION BY NUCLEIC ACID (DNA OR RNA); SEVERE ACUTE RESPIRATORY SYNDROME CORONAVIRUS 2 (SARS-COV-2) (CORONAVIRUS DISEASE [COVID-19]), AMPLIFIED PROBE TECHNIQUE, MAKING USE OF HIGH THROUGHPUT TECHNOLOGIES AS DESCRIBED BY CMS-2020-01-R: HCPCS | Performed by: EMERGENCY MEDICINE

## 2022-03-29 LAB
SARS-COV-2 RNA RESP QL NAA+PROBE: NOT DETECTED
SARS-COV-2- CYCLE NUMBER: NORMAL

## 2023-06-08 NOTE — HOSPITAL COURSE
"05/08/2020:  Patient is a 70-year-old male that was admitted last evening with reportedly having fallen at home and is unable to care for himself.  Patient states he does not know why he is here he feels fine.  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  Vital signs showed blood pressure mildly elevated 163/67 pulse 73 respirations 18 temperature 99.5° the and O2 sat 93%.  Patient showed T-max of 101.1° overnight.  Labs show a white blood cell count of 14.3 hemoglobin 13.6 hematocrit 40 differential shows 77 granulocytes 11 lymphocytes sed rate greater than 90.  Chemistry shows sodium 135 potassium 3.2 chloride 101 bicarb 24 anion gap 10 BUN 16 creatinine 1.0 and GFR greater than 60  CT of the brain revealed cortical atrophy deep white matter changes mild-to-moderate hydrocephalus.  Chest x-ray revealed no acute chest disease.    5/9/2020  Patient stable. Denies any pain. Still with confusion but very talkative and alert. WBC has normalized. Labs are stable. Patient now awaiting placement. Will continue on IV antibiotics.    5/10/2020  Patient stable. No change in care plan.     05/11/2020:  Patient is continued to be stable labs were reviewed and unremarkable  White blood cell count normal hemoglobin 13.1 hematocrit 39  CMP completely normal  BP (!) 115/50 (BP Location: Left arm, Patient Position: Lying)   Pulse 79   Temp 96.4 °F (35.8 °C) (Axillary)   Resp 18   Ht 5' 10" (1.778 m)   Wt 114.6 kg (252 lb 10.4 oz)   SpO2 99%   BMI 36.25 kg/m²      05/12/2020:  BP (!) 142/89 (BP Location: Right arm, Patient Position: Lying)   Pulse 78   Temp 97 °F (36.1 °C) (Axillary)   Resp 17   Ht 5' 10" (1.778 m)   Wt 114.6 kg (252 lb 10.4 oz)   SpO2 96%   BMI 36.25 kg/m²    Blood cultures negative to date.  Comprehensive metabolic panel normal except for albumin 3.3  CBC normal white blood cell count 10.0 hemoglobin 13.7 hematocrit 40 and differential was " well developed, well nourished , in no acute distress , ambulating without difficulty , normal communication ability "normal    05/13/2020:  BP (!) 142/74 (BP Location: Right arm, Patient Position: Lying)   Pulse 72   Temp 97.8 °F (36.6 °C) (Oral)   Resp 18   Ht 5' 10" (1.778 m)   Wt 114.6 kg (252 lb 10.4 oz)   SpO2 95%   BMI 36.25 kg/m²    Vancomycin trough is 19.9 phosphorus and magnesium are normal.  Comprehensive metabolic panel normal  Patient is awaiting placement for rehab./or long-term placement.  Patient would benefit greatly from psychiatric evaluation and treatment    05/14/2020:  BP (!) 142/86 (BP Location: Right arm, Patient Position: Lying)   Pulse 84   Temp 98.1 °F (36.7 °C) (Oral)   Resp 18   Ht 5' 10" (1.778 m)   Wt 114.6 kg (252 lb 10.4 oz)   SpO2 96%   BMI 36.25 kg/m²    Chem profile completely normal liver ALT 53 albumin 3.4 and BUN 24 creatinine 0.8  Patient awaits placement for mental health issues.    05/15/2020:  Patient stable with vital signs show blood pressure 1 pre 8/70 pulse 64 respirations 18 O2 sats 94-96%  Labs pending.  Patient is awaiting placement for long-term care.  This should happen on Monday 05/18/2020    05/15/2020:  Patient has been accepted by [facility]  Will transfer in stable condition and continue amlodipine 5 mg p.o. daily and Seroquel 25 mg b.i.d.  Cystitis has resolved and no other significant problems at discharge  "